# Patient Record
Sex: FEMALE | Race: WHITE | Employment: FULL TIME | URBAN - METROPOLITAN AREA
[De-identification: names, ages, dates, MRNs, and addresses within clinical notes are randomized per-mention and may not be internally consistent; named-entity substitution may affect disease eponyms.]

---

## 2017-11-29 PROBLEM — I89.0 LYMPHEDEMA: Status: ACTIVE | Noted: 2017-11-29

## 2017-11-29 PROBLEM — I87.8 VENOUS STASIS: Status: ACTIVE | Noted: 2017-11-29

## 2018-01-24 PROBLEM — N06.9 ISOLATED PROTEINURIA WITH MORPHOLOGIC LESION: Status: ACTIVE | Noted: 2018-01-24

## 2019-02-21 ENCOUNTER — TELEPHONE (OUTPATIENT)
Dept: INTERNAL MEDICINE | Age: 69
End: 2019-02-21

## 2019-06-22 ENCOUNTER — APPOINTMENT (OUTPATIENT)
Dept: GENERAL RADIOLOGY | Age: 69
End: 2019-06-22
Payer: COMMERCIAL

## 2019-06-22 ENCOUNTER — APPOINTMENT (OUTPATIENT)
Dept: CT IMAGING | Age: 69
End: 2019-06-22
Payer: COMMERCIAL

## 2019-06-22 ENCOUNTER — HOSPITAL ENCOUNTER (EMERGENCY)
Age: 69
Discharge: HOME | End: 2019-06-22
Attending: EMERGENCY MEDICINE
Payer: COMMERCIAL

## 2019-06-22 VITALS
HEART RATE: 82 BPM | BODY MASS INDEX: 28.32 KG/M2 | OXYGEN SATURATION: 95 % | RESPIRATION RATE: 16 BRPM | HEIGHT: 61 IN | TEMPERATURE: 97.9 F | WEIGHT: 150 LBS | SYSTOLIC BLOOD PRESSURE: 153 MMHG | DIASTOLIC BLOOD PRESSURE: 89 MMHG

## 2019-06-22 DIAGNOSIS — S80.02XA CONTUSION OF LEFT KNEE, INITIAL ENCOUNTER: ICD-10-CM

## 2019-06-22 DIAGNOSIS — M54.50 LUMBAR BACK PAIN: Primary | ICD-10-CM

## 2019-06-22 DIAGNOSIS — M47.816 OSTEOARTHRITIS OF LUMBAR SPINE, UNSPECIFIED SPINAL OSTEOARTHRITIS COMPLICATION STATUS: ICD-10-CM

## 2019-06-22 LAB
ANION GAP SERPL CALCULATED.3IONS-SCNC: 9 MMOL/L (ref 7–16)
BUN BLDV-MCNC: 12 MG/DL (ref 8–23)
CALCIUM SERPL-MCNC: 9.2 MG/DL (ref 8.6–10.2)
CHLORIDE BLD-SCNC: 104 MMOL/L (ref 98–107)
CO2: 29 MMOL/L (ref 22–29)
CREAT SERPL-MCNC: 0.9 MG/DL (ref 0.5–1)
GFR AFRICAN AMERICAN: >60
GFR NON-AFRICAN AMERICAN: >60 ML/MIN/1.73
GLUCOSE BLD-MCNC: 135 MG/DL (ref 74–99)
HCT VFR BLD CALC: 37.4 % (ref 34–48)
HEMOGLOBIN: 12 G/DL (ref 11.5–15.5)
MCH RBC QN AUTO: 31.5 PG (ref 26–35)
MCHC RBC AUTO-ENTMCNC: 32.1 % (ref 32–34.5)
MCV RBC AUTO: 98.2 FL (ref 80–99.9)
PDW BLD-RTO: 13.8 FL (ref 11.5–15)
PLATELET # BLD: 168 E9/L (ref 130–450)
PMV BLD AUTO: 10 FL (ref 7–12)
POTASSIUM SERPL-SCNC: 4.6 MMOL/L (ref 3.5–5)
RBC # BLD: 3.81 E12/L (ref 3.5–5.5)
SODIUM BLD-SCNC: 142 MMOL/L (ref 132–146)
WBC # BLD: 4.5 E9/L (ref 4.5–11.5)

## 2019-06-22 PROCEDURE — 73564 X-RAY EXAM KNEE 4 OR MORE: CPT

## 2019-06-22 PROCEDURE — 72125 CT NECK SPINE W/O DYE: CPT

## 2019-06-22 PROCEDURE — 96375 TX/PRO/DX INJ NEW DRUG ADDON: CPT

## 2019-06-22 PROCEDURE — 85027 COMPLETE CBC AUTOMATED: CPT

## 2019-06-22 PROCEDURE — 99284 EMERGENCY DEPT VISIT MOD MDM: CPT

## 2019-06-22 PROCEDURE — 73502 X-RAY EXAM HIP UNI 2-3 VIEWS: CPT

## 2019-06-22 PROCEDURE — 70450 CT HEAD/BRAIN W/O DYE: CPT

## 2019-06-22 PROCEDURE — 36415 COLL VENOUS BLD VENIPUNCTURE: CPT

## 2019-06-22 PROCEDURE — 96374 THER/PROPH/DIAG INJ IV PUSH: CPT

## 2019-06-22 PROCEDURE — 80048 BASIC METABOLIC PNL TOTAL CA: CPT

## 2019-06-22 PROCEDURE — 72131 CT LUMBAR SPINE W/O DYE: CPT

## 2019-06-22 PROCEDURE — 6360000002 HC RX W HCPCS: Performed by: EMERGENCY MEDICINE

## 2019-06-22 RX ORDER — ONDANSETRON 2 MG/ML
4 INJECTION INTRAMUSCULAR; INTRAVENOUS ONCE
Status: COMPLETED | OUTPATIENT
Start: 2019-06-22 | End: 2019-06-22

## 2019-06-22 RX ORDER — NAPROXEN 500 MG/1
500 TABLET ORAL 2 TIMES DAILY
Qty: 14 TABLET | Refills: 0 | Status: SHIPPED | OUTPATIENT
Start: 2019-06-22 | End: 2020-01-10 | Stop reason: ALTCHOICE

## 2019-06-22 RX ORDER — MORPHINE SULFATE 2 MG/ML
2 INJECTION, SOLUTION INTRAMUSCULAR; INTRAVENOUS ONCE
Status: COMPLETED | OUTPATIENT
Start: 2019-06-22 | End: 2019-06-22

## 2019-06-22 RX ADMIN — MORPHINE SULFATE 2 MG: 2 INJECTION, SOLUTION INTRAMUSCULAR; INTRAVENOUS at 21:55

## 2019-06-22 RX ADMIN — ONDANSETRON 4 MG: 2 INJECTION INTRAMUSCULAR; INTRAVENOUS at 21:55

## 2019-06-22 ASSESSMENT — PAIN DESCRIPTION - INTENSITY: RATING_2: 4

## 2019-06-22 ASSESSMENT — PAIN SCALES - GENERAL
PAINLEVEL_OUTOF10: 1
PAINLEVEL_OUTOF10: 6

## 2019-06-22 ASSESSMENT — PAIN DESCRIPTION - DURATION: DURATION_2: CONTINUOUS

## 2019-06-22 ASSESSMENT — PAIN DESCRIPTION - PROGRESSION
CLINICAL_PROGRESSION: GRADUALLY WORSENING
CLINICAL_PROGRESSION_2: GRADUALLY WORSENING

## 2019-06-22 ASSESSMENT — PAIN DESCRIPTION - ORIENTATION
ORIENTATION: LEFT
ORIENTATION_2: RIGHT

## 2019-06-22 ASSESSMENT — PAIN DESCRIPTION - PAIN TYPE
TYPE_2: ACUTE PAIN
TYPE: ACUTE PAIN

## 2019-06-22 ASSESSMENT — PAIN DESCRIPTION - DESCRIPTORS
DESCRIPTORS_2: ACHING
DESCRIPTORS: ACHING

## 2019-06-22 ASSESSMENT — PAIN DESCRIPTION - LOCATION
LOCATION: KNEE;HIP
LOCATION_2: NECK

## 2019-06-22 ASSESSMENT — PAIN DESCRIPTION - ONSET
ONSET_2: SUDDEN
ONSET: SUDDEN

## 2019-06-22 ASSESSMENT — PAIN DESCRIPTION - FREQUENCY: FREQUENCY: CONTINUOUS

## 2019-06-23 NOTE — ED NOTES
Bed: HE  Expected date:   Expected time:   Means of arrival:   Comments:  Hold EMS     Dorn Habermann, SHRUTHI  06/22/19 2005

## 2019-06-23 NOTE — ED NOTES
Pt ambulate from room four to hallway D w/stand by assist and wheeled walker. Pt tolerated fair. C/O pain in knees. Pt back in bed in [place] at this time. Will continue to monitor.      Vikash Almodovar RN  06/22/19 [id number]

## 2019-06-23 NOTE — ED PROVIDER NOTES
moist.   Neck: C collar in place. Respiratory: Lungs clear to auscultation bilaterally, no wheezes, rales, or rhonchi. Not in respiratory distress   Cardiovascular:  Regular rate. Regular rhythm. No murmurs, gallops, or rubs. 2+ distal pulses  GI:  Abdomen Soft, Non tender, Non distended. No rebound, guarding, or rigidity. Musculoskeletal: Moves all extremities x 4. Warm and well perfused. Tenderness to the lumbar spine, bilateral knee pain, left hip tenderness, limited ROM of the bilateral legs due to pain. Bilateral lower extremity weakness,  greater on the left compared to the right. Able to plantarflex and dorsiflex  Integument: skin warm and dry. Abrasion to the left forearm. Neurologic: GCS 15, moves all extremities noted weakness with raising left upper extremity able to plantarflex and dorsiflex, normal strength upper extremities  -------------------------------------------------- RESULTS -------------------------------------------------  I have personally reviewed all laboratory and imaging results for this patient. Results are listed below.      LABS:  Results for orders placed or performed during the hospital encounter of 06/22/19   CBC   Result Value Ref Range    WBC 4.5 4.5 - 11.5 E9/L    RBC 3.81 3.50 - 5.50 E12/L    Hemoglobin 12.0 11.5 - 15.5 g/dL    Hematocrit 37.4 34.0 - 48.0 %    MCV 98.2 80.0 - 99.9 fL    MCH 31.5 26.0 - 35.0 pg    MCHC 32.1 32.0 - 34.5 %    RDW 13.8 11.5 - 15.0 fL    Platelets 141 670 - 785 E9/L    MPV 10.0 7.0 - 12.0 fL   Basic Metabolic Panel   Result Value Ref Range    Sodium 142 132 - 146 mmol/L    Potassium 4.6 3.5 - 5.0 mmol/L    Chloride 104 98 - 107 mmol/L    CO2 29 22 - 29 mmol/L    Anion Gap 9 7 - 16 mmol/L    Glucose 135 (H) 74 - 99 mg/dL    BUN 12 8 - 23 mg/dL    CREATININE 0.9 0.5 - 1.0 mg/dL    GFR Non-African American >60 >=60 mL/min/1.73    GFR African American >60     Calcium 9.2 8.6 - 10.2 mg/dL       RADIOLOGY:  Interpreted by Radiologist.  Aleja Agudelo Cervical Spine WO Contrast   Final Result   No evidence of fracture or dislocation of cervical spine. There is   diffuse degenerative changes from C2 to T1 with osteophytes and   multilevel disc bulges. CT Head WO Contrast   Final Result   No evidence of intracranial hemorrhage or edema. There is   age-appropriate atrophy. CT Lumbar Spine WO Contrast   Final Result   No acute fracture. Diffuse degenerative changes with multilevel disc bulges. XR KNEE LEFT (MIN 4 VIEWS)   Final Result      NO ACUTE FRACTURE OR DISLOCATION OF THE LEFT KNEE. XR KNEE RIGHT (MIN 4 VIEWS)   Final Result      NO ACUTE FRACTURE OR DISLOCATION OF THE RIGHT KNEE.         XR HIP LEFT (2-3 VIEWS)   Final Result      NO ACUTE FRACTURE OR DISLOCATION LEFT HIP.        ------------------------- NURSING NOTES AND VITALS REVIEWED ---------------------------   The nursing notes within the ED encounter and vital signs as below have been reviewed by myself. BP (!) 153/89   Pulse 82   Temp 97.9 °F (36.6 °C) (Temporal)   Resp 16   Ht 5' 1\" (1.549 m)   Wt 150 lb (68 kg)   BMI 28.34 kg/m²   Oxygen Saturation Interpretation: Normal    The patients available past medical records and past encounters were reviewed. ------------------------------ ED COURSE/MEDICAL DECISION MAKING----------------------  Medications   morphine (PF) injection 2 mg (2 mg Intravenous Given 6/22/19 2155)   ondansetron (ZOFRAN) injection 4 mg (4 mg Intravenous Given 6/22/19 2155)       Medical Decision Making:    Patient arrived in the ED by EMS for a fall while she was working at [employer]. Patient tripped over a mat and is not sure if she hit her head, but did not have LOC.   Patient has bilateral knee pain, left hip pain, weakness to the bilateral legs that is greater in the left, limited ROM to the bilateral lower extremities due to pain, abrasion to the left forearm, tenderness to the lumbar spine, and

## 2019-06-25 ENCOUNTER — OFFICE VISIT (OUTPATIENT)
Dept: INTERNAL MEDICINE | Age: 69
End: 2019-06-25
Payer: COMMERCIAL

## 2019-06-25 VITALS
WEIGHT: 160.5 LBS | BODY MASS INDEX: 30.3 KG/M2 | HEIGHT: 61 IN | HEART RATE: 71 BPM | RESPIRATION RATE: 18 BRPM | SYSTOLIC BLOOD PRESSURE: 122 MMHG | DIASTOLIC BLOOD PRESSURE: 78 MMHG | TEMPERATURE: 97.4 F

## 2019-06-25 DIAGNOSIS — Z91.81 AT HIGH RISK FOR FALLS: Primary | ICD-10-CM

## 2019-06-25 PROCEDURE — 1090F PRES/ABSN URINE INCON ASSESS: CPT | Performed by: INTERNAL MEDICINE

## 2019-06-25 PROCEDURE — G8427 DOCREV CUR MEDS BY ELIG CLIN: HCPCS | Performed by: INTERNAL MEDICINE

## 2019-06-25 PROCEDURE — 1123F ACP DISCUSS/DSCN MKR DOCD: CPT | Performed by: INTERNAL MEDICINE

## 2019-06-25 PROCEDURE — 99212 OFFICE O/P EST SF 10 MIN: CPT | Performed by: INTERNAL MEDICINE

## 2019-06-25 PROCEDURE — 1036F TOBACCO NON-USER: CPT | Performed by: INTERNAL MEDICINE

## 2019-06-25 PROCEDURE — G8400 PT W/DXA NO RESULTS DOC: HCPCS | Performed by: INTERNAL MEDICINE

## 2019-06-25 PROCEDURE — 4040F PNEUMOC VAC/ADMIN/RCVD: CPT | Performed by: INTERNAL MEDICINE

## 2019-06-25 PROCEDURE — G8417 CALC BMI ABV UP PARAM F/U: HCPCS | Performed by: INTERNAL MEDICINE

## 2019-06-25 PROCEDURE — 99213 OFFICE O/P EST LOW 20 MIN: CPT | Performed by: INTERNAL MEDICINE

## 2019-06-25 PROCEDURE — 3017F COLORECTAL CA SCREEN DOC REV: CPT | Performed by: INTERNAL MEDICINE

## 2019-06-25 ASSESSMENT — PATIENT HEALTH QUESTIONNAIRE - PHQ9
SUM OF ALL RESPONSES TO PHQ9 QUESTIONS 1 & 2: 0
1. LITTLE INTEREST OR PLEASURE IN DOING THINGS: 0
SUM OF ALL RESPONSES TO PHQ QUESTIONS 1-9: 0
SUM OF ALL RESPONSES TO PHQ QUESTIONS 1-9: 0
2. FEELING DOWN, DEPRESSED OR HOPELESS: 0

## 2019-06-25 ASSESSMENT — ENCOUNTER SYMPTOMS
SHORTNESS OF BREATH: 0
STRIDOR: 0
SINUS PAIN: 0
RHINORRHEA: 0
ABDOMINAL PAIN: 0
COUGH: 0
NAUSEA: 0
DIARRHEA: 0
WHEEZING: 0
CONSTIPATION: 0
BLOOD IN STOOL: 0
VOMITING: 0
BACK PAIN: 1

## 2019-06-25 NOTE — LETTER
[address] [place] [address] Internal Medicine  [address] [facility] Na[address] AMARAL  Phone: [phone]  Fax: [phone]    Karan Craven MD        June 25, 2019     Patient: Irish Argueta   YOB: 1950   Date of Visit: 6/25/2019       To Whom it May Concern:    Osmel Eden was seen in the hospital 6/23/19 may return to work on 6/25/19. If you have any questions or concerns, please don't hesitate to call.     Sincerely,         Karan Craven MD

## 2019-06-25 NOTE — PROGRESS NOTES
Patient verbalized understanding of office instructions. She will call with questions or concerns. She was given discharge instructions, and all questions were fully answered.  Letter for work was given to pt Instructed to stop  At  for printed AVS

## 2019-06-25 NOTE — PROGRESS NOTES
[facility]  Internal Medicine Clinic    Attending Physician Statement:  Salina Miles M.D., F.A.C.P. I have discussed the case, including pertinent history and exam findings with the resident. I have seen and examined the patient and the key elements of the encounter have been performed by me. I agree with the assessment, plan and orders as documented by the resident. Patient is seen for fu visit today. Last office notes reviewed, relative labs and imaging. Sporadic fu -- 2017 - ER fu various issues  One visit 2018-echo fu not significant, some lymphedema    Now ER fu   Sp mechanical fall-- multiple pain complaints, seen in ER  (We can't do workers comp-- would need to establish with doc of record if pursues  -- ok for verbal return to work  [employer] of medical problems as per resident note.

## 2019-06-25 NOTE — LETTER
[address] [place] [address] Internal Medicine  [address] [place] [address] Na[place] AMARAL  Phone: [phone]  Fax: [phone]    Gennaro Redman MD        June 25, 2019     Patient: Joseph Kingston   YOB: 1950   Date of Visit: 6/25/2019       To Whom it May Concern:    Ghassan Muhammad was seen in the hospital 6/22/19 may return to work on 6/25/19. If you have any questions or concerns, please don't hesitate to call.     Sincerely,         Gennaro Redman MD

## 2019-06-25 NOTE — PATIENT INSTRUCTIONS
Discharge instructions:  Establish with a Primary care doctor that deals with worker's compensation      Patient Education        Preventing Falls: Care Instructions  Your Care Instructions    Getting around your home safely can be a challenge if you have injuries or health problems that make it easy for you to fall. Loose rugs and furniture in walkways are among the dangers for many older people who have problems walking or who have poor eyesight. People who have conditions such as arthritis, osteoporosis, or dementia also have to be careful not to fall. You can make your home safer with a few simple measures. Follow-up care is a key part of your treatment and safety. Be sure to make and go to all appointments, and call your doctor if you are having problems. It's also a good idea to know your test results and keep a list of the medicines you take. How can you care for yourself at home? Taking care of yourself  · You may get dizzy if you do not drink enough water. To prevent dehydration, drink plenty of fluids, enough so that your urine is light yellow or clear like water. Choose water and other caffeine-free clear liquids. If you have kidney, heart, or liver disease and have to limit fluids, talk with your doctor before you increase the amount of fluids you drink. · Exercise regularly to improve your strength, muscle tone, and balance. Walk if you can. Swimming may be a good choice if you cannot walk easily. · Have your vision and hearing checked each year or any time you notice a change. If you have trouble seeing and hearing, you might not be able to avoid objects and could lose your balance. · Know the side effects of the medicines you take. Ask your doctor or pharmacist whether the medicines you take can affect your balance. Sleeping pills or sedatives can affect your balance. · Limit the amount of alcohol you drink. Alcohol can impair your balance and other senses.   · Ask your doctor whether calluses or corns on your feet need to be removed. If you wear loose-fitting shoes because of calluses or corns, you can lose your balance and fall. · Talk to your doctor if you have numbness in your feet. Preventing falls at home  · Remove raised doorway thresholds, throw rugs, and clutter. Repair loose carpet or raised areas in the floor. · Move furniture and electrical cords to keep them out of walking paths. · Use nonskid floor wax, and wipe up spills right away, especially on ceramic tile floors. · If you use a walker or cane, put rubber tips on it. If you use crutches, clean the bottoms of them regularly with an abrasive pad, such as steel wool. · Keep your house well lit, especially Lalitha Serene, and outside walkways. Use night-lights in areas such as hallways and bathrooms. Add extra light switches or use remote switches (such as switches that go on or off when you clap your hands) to make it easier to turn lights on if you have to get up during the night. · Install sturdy handrails on stairways. · Move items in your cabinets so that the things you use a lot are on the lower shelves (about waist level). · Keep a cordless phone and a flashlight with new batteries by your bed. If possible, put a phone in each of the main rooms of your house, or carry a cell phone in case you fall and cannot reach a phone. Or, you can wear a device around your neck or wrist. You push a button that sends a signal for help. · Wear low-heeled shoes that fit well and give your feet good support. Use footwear with nonskid soles. Check the heels and soles of your shoes for wear. Repair or replace worn heels or soles. · Do not wear socks without shoes on wood floors. · Walk on the grass when the sidewalks are slippery. If you live in an area that gets snow and ice in the winter, sprinkle salt on slippery steps and sidewalks.   Preventing falls in the bath  · Install grab bars and nonskid mats inside and outside your shower or tub and near the toilet and sinks. · Use shower chairs and bath benches. · Use a hand-held shower head that will allow you to sit while showering. · Get into a tub or shower by putting the weaker leg in first. Get out of a tub or shower with your strong side first.  · Repair loose toilet seats and consider installing a raised toilet seat to make getting on and off the toilet easier. · Keep your bathroom door unlocked while you are in the shower. Where can you learn more? Go to https://Poyntpepiceweb.Qian Xiaoâ€™er. org and sign in to your [employer] account. Enter [phone] in the [employer].com box to learn more about \"Preventing Falls: Care Instructions. \"     If you do not have an account, please click on the \"Sign Up Now\" link. Current as of: November 7, 2018  Content Version: 12.0  © 2861-9583 Healthwise, Incorporated. Care instructions adapted under license by [facility] [address] If you have questions about a medical condition or this instruction, always ask your healthcare professional. [place] [address] any warranty or liability for your use of this information.

## 2019-06-25 NOTE — PROGRESS NOTES
[facility]  InternalMedicine Residency Program  ACC Note      SUBJECTIVE:  CC: had concerns including Follow-up (from ER fell at work on both knees, also had low back pain). HPI:Ora Ambrose is a 70 y.o. presents to the [facility] as an ER follow-up . She presented to the ED 3 days ago following a mechanical fall that happened a few hours prior to arrival. She was at worked, tripped over a mat and fell, unsure of head trauma, but denies LOC. She did complain of pain in multiple locations: b/l knees, left hip and weakness in b/l lower extremities. Imaging done was negative for any acute fracture of dislocation, however evidence of diffuse degenerative changes were observed with bulging discs at multiple levels C2-T1 and Lumbar spine as well. She was given NSAIDs  She mentions worker's compensation which I relayed we were unable to process from this clinic. She was advised to establish and follow up with a primary care doctor. Review of Systems   Constitutional: Negative for chills, fatigue and fever. HENT: Negative for congestion, rhinorrhea and sinus pain. Respiratory: Negative for cough, shortness of breath, wheezing and stridor. Cardiovascular: Negative for chest pain, palpitations and leg swelling. Gastrointestinal: Negative for abdominal pain, blood in stool, constipation, diarrhea, nausea and vomiting. Genitourinary: Negative for dysuria, frequency and hematuria. Musculoskeletal: Positive for arthralgias, back pain and gait problem. Negative for myalgias. Skin: Negative for rash. Allergic/Immunologic: Negative for environmental allergies. Neurological: Negative for dizziness, weakness, numbness and headaches. Hematological: Does not bruise/bleed easily. Psychiatric/Behavioral: Negative for suicidal ideas.        No outpatient medications have been marked as taking for the 6/25/19 encounter (Office Visit) with Sam Keane MD.       I have reviewed all pertinent PMHx, PSHx, FamHx, SocialHx, medications, and allergiesand updated history as appropriate. OBJECTIVE:    VS: There were no vitals taken for this visit. Physical Exam   Constitutional: She is oriented to person, place, and time. She appears well-developed and well-nourished. No distress. HENT:   Head: Normocephalic and atraumatic. Eyes: Pupils are equal, round, and reactive to light. Conjunctivae are normal. No scleral icterus. Cardiovascular: Normal rate, regular rhythm, S1 normal, S2 normal, normal heart sounds and intact distal pulses. No murmur heard. Pulmonary/Chest: Effort normal and breath sounds normal. No respiratory distress. She has no wheezes. She has no rales. Abdominal: Soft. Bowel sounds are normal. She exhibits no distension and no mass. There is no tenderness. There is no guarding. Musculoskeletal: She exhibits tenderness. She exhibits no edema. Left knee tenderness and mild abrasion   Neurological: She is alert and oriented to person, place, and time. No cranial nerve deficit. Skin: Skin is warm and dry. She is not diaphoretic.        ASSESSMENT/PLAN:    Mechanical fall   - no fractures or dislocations  - patient stable and able to return to work    Patient advised to establish with a PCP that deals with worker's compensation      I have reviewed my findings and recommendations with Tony Martinez and Dr Cristal Mason MD PGY-1   6/25/2019 9:53 AM

## 2019-10-04 ENCOUNTER — TELEPHONE (OUTPATIENT)
Dept: ADMINISTRATIVE | Age: 69
End: 2019-10-04

## 2019-11-24 ENCOUNTER — APPOINTMENT (OUTPATIENT)
Dept: GENERAL RADIOLOGY | Age: 69
DRG: 069 | End: 2019-11-24
Payer: COMMERCIAL

## 2019-11-24 ENCOUNTER — APPOINTMENT (OUTPATIENT)
Dept: CT IMAGING | Age: 69
DRG: 069 | End: 2019-11-24
Payer: COMMERCIAL

## 2019-11-24 ENCOUNTER — APPOINTMENT (OUTPATIENT)
Dept: MRI IMAGING | Age: 69
DRG: 069 | End: 2019-11-24
Payer: COMMERCIAL

## 2019-11-24 ENCOUNTER — HOSPITAL ENCOUNTER (INPATIENT)
Age: 69
LOS: 3 days | Discharge: HOME HEALTH | DRG: 069 | End: 2019-11-27
Attending: EMERGENCY MEDICINE | Admitting: INTERNAL MEDICINE
Payer: COMMERCIAL

## 2019-11-24 DIAGNOSIS — R47.9 DIFFICULTY WITH SPEECH: ICD-10-CM

## 2019-11-24 DIAGNOSIS — M48.02 CERVICAL SPINAL STENOSIS: ICD-10-CM

## 2019-11-24 DIAGNOSIS — R41.82 ALTERED MENTAL STATUS, UNSPECIFIED ALTERED MENTAL STATUS TYPE: Primary | ICD-10-CM

## 2019-11-24 DIAGNOSIS — M48.061 DEGENERATIVE LUMBAR SPINAL STENOSIS: ICD-10-CM

## 2019-11-24 LAB
ACETAMINOPHEN LEVEL: <5 MCG/ML (ref 10–30)
ALBUMIN SERPL-MCNC: 4.1 G/DL (ref 3.5–5.2)
ALP BLD-CCNC: 90 U/L (ref 35–104)
ALT SERPL-CCNC: 9 U/L (ref 0–32)
AMMONIA: 15 UMOL/L (ref 11–51)
AMPHETAMINE SCREEN, URINE: NOT DETECTED
ANION GAP SERPL CALCULATED.3IONS-SCNC: 12 MMOL/L (ref 7–16)
AST SERPL-CCNC: 12 U/L (ref 0–31)
BACTERIA: ABNORMAL /HPF
BARBITURATE SCREEN URINE: NOT DETECTED
BASOPHILS ABSOLUTE: 0.01 E9/L (ref 0–0.2)
BASOPHILS RELATIVE PERCENT: 0.3 % (ref 0–2)
BENZODIAZEPINE SCREEN, URINE: NOT DETECTED
BILIRUB SERPL-MCNC: 0.3 MG/DL (ref 0–1.2)
BILIRUBIN URINE: NEGATIVE
BLOOD, URINE: ABNORMAL
BUN BLDV-MCNC: 12 MG/DL (ref 8–23)
CALCIUM SERPL-MCNC: 9.1 MG/DL (ref 8.6–10.2)
CANNABINOID SCREEN URINE: NOT DETECTED
CHLORIDE BLD-SCNC: 106 MMOL/L (ref 98–107)
CHOLESTEROL, TOTAL: 197 MG/DL (ref 0–199)
CLARITY: CLEAR
CO2: 25 MMOL/L (ref 22–29)
COCAINE METABOLITE SCREEN URINE: NOT DETECTED
COLOR: YELLOW
CREAT SERPL-MCNC: 0.9 MG/DL (ref 0.5–1)
EKG ATRIAL RATE: 78 BPM
EKG P AXIS: 52 DEGREES
EKG P-R INTERVAL: 180 MS
EKG Q-T INTERVAL: 402 MS
EKG QRS DURATION: 88 MS
EKG QTC CALCULATION (BAZETT): 458 MS
EKG R AXIS: 40 DEGREES
EKG T AXIS: 49 DEGREES
EKG VENTRICULAR RATE: 78 BPM
EOSINOPHILS ABSOLUTE: 0.04 E9/L (ref 0.05–0.5)
EOSINOPHILS RELATIVE PERCENT: 1.1 % (ref 0–6)
EPITHELIAL CELLS, UA: ABNORMAL /HPF
ETHANOL: <10 MG/DL (ref 0–0.08)
FENTANYL SCREEN, URINE: NOT DETECTED
FOLATE: 19.1 NG/ML (ref 4.8–24.2)
GFR AFRICAN AMERICAN: >60
GFR NON-AFRICAN AMERICAN: >60 ML/MIN/1.73
GLUCOSE BLD-MCNC: 105 MG/DL (ref 74–99)
GLUCOSE URINE: NEGATIVE MG/DL
HBA1C MFR BLD: 6.4 % (ref 4–5.6)
HCT VFR BLD CALC: 39.8 % (ref 34–48)
HDLC SERPL-MCNC: 52 MG/DL
HEMOGLOBIN: 12.6 G/DL (ref 11.5–15.5)
IMMATURE GRANULOCYTES #: 0.02 E9/L
IMMATURE GRANULOCYTES %: 0.6 % (ref 0–5)
KETONES, URINE: NEGATIVE MG/DL
LACTIC ACID: 1.3 MMOL/L (ref 0.5–2.2)
LDL CHOLESTEROL CALCULATED: 132 MG/DL (ref 0–99)
LEUKOCYTE ESTERASE, URINE: NEGATIVE
LIPASE: 45 U/L (ref 13–60)
LYMPHOCYTES ABSOLUTE: 1.71 E9/L (ref 1.5–4)
LYMPHOCYTES RELATIVE PERCENT: 47.9 % (ref 20–42)
Lab: NORMAL
MCH RBC QN AUTO: 30.7 PG (ref 26–35)
MCHC RBC AUTO-ENTMCNC: 31.7 % (ref 32–34.5)
MCV RBC AUTO: 96.8 FL (ref 80–99.9)
METER GLUCOSE: 113 MG/DL (ref 74–99)
METER GLUCOSE: 79 MG/DL (ref 74–99)
METHADONE SCREEN, URINE: NOT DETECTED
MONOCYTES ABSOLUTE: 0.3 E9/L (ref 0.1–0.95)
MONOCYTES RELATIVE PERCENT: 8.4 % (ref 2–12)
NEUTROPHILS ABSOLUTE: 1.49 E9/L (ref 1.8–7.3)
NEUTROPHILS RELATIVE PERCENT: 41.7 % (ref 43–80)
NITRITE, URINE: NEGATIVE
OPIATE SCREEN URINE: NOT DETECTED
OXYCODONE URINE: NOT DETECTED
PDW BLD-RTO: 13.9 FL (ref 11.5–15)
PH UA: 7 (ref 5–9)
PHENCYCLIDINE SCREEN URINE: NOT DETECTED
PLATELET # BLD: 162 E9/L (ref 130–450)
PMV BLD AUTO: 10.5 FL (ref 7–12)
POTASSIUM REFLEX MAGNESIUM: 4 MMOL/L (ref 3.5–5)
PRO-BNP: 41 PG/ML (ref 0–125)
PROTEIN UA: NEGATIVE MG/DL
RBC # BLD: 4.11 E12/L (ref 3.5–5.5)
RBC UA: ABNORMAL /HPF (ref 0–2)
SALICYLATE, SERUM: <0.3 MG/DL (ref 0–30)
SODIUM BLD-SCNC: 143 MMOL/L (ref 132–146)
SPECIFIC GRAVITY UA: 1.01 (ref 1–1.03)
T4 FREE: 1.11 NG/DL (ref 0.93–1.7)
TOTAL PROTEIN: 6.8 G/DL (ref 6.4–8.3)
TRICYCLIC ANTIDEPRESSANTS SCREEN SERUM: NEGATIVE NG/ML
TRIGL SERPL-MCNC: 66 MG/DL (ref 0–149)
TROPONIN: <0.01 NG/ML (ref 0–0.03)
TSH SERPL DL<=0.05 MIU/L-ACNC: 3.36 UIU/ML (ref 0.27–4.2)
UROBILINOGEN, URINE: 0.2 E.U./DL
VITAMIN B-12: 347 PG/ML (ref 211–946)
VLDLC SERPL CALC-MCNC: 13 MG/DL
WBC # BLD: 3.6 E9/L (ref 4.5–11.5)
WBC UA: ABNORMAL /HPF (ref 0–5)

## 2019-11-24 PROCEDURE — 93005 ELECTROCARDIOGRAM TRACING: CPT | Performed by: EMERGENCY MEDICINE

## 2019-11-24 PROCEDURE — 80053 COMPREHEN METABOLIC PANEL: CPT

## 2019-11-24 PROCEDURE — 6370000000 HC RX 637 (ALT 250 FOR IP): Performed by: STUDENT IN AN ORGANIZED HEALTH CARE EDUCATION/TRAINING PROGRAM

## 2019-11-24 PROCEDURE — 93010 ELECTROCARDIOGRAM REPORT: CPT | Performed by: INTERNAL MEDICINE

## 2019-11-24 PROCEDURE — 83605 ASSAY OF LACTIC ACID: CPT

## 2019-11-24 PROCEDURE — 6370000000 HC RX 637 (ALT 250 FOR IP): Performed by: INTERNAL MEDICINE

## 2019-11-24 PROCEDURE — 83690 ASSAY OF LIPASE: CPT

## 2019-11-24 PROCEDURE — 82607 VITAMIN B-12: CPT

## 2019-11-24 PROCEDURE — 80307 DRUG TEST PRSMV CHEM ANLYZR: CPT

## 2019-11-24 PROCEDURE — 2140000000 HC CCU INTERMEDIATE R&B

## 2019-11-24 PROCEDURE — 84439 ASSAY OF FREE THYROXINE: CPT

## 2019-11-24 PROCEDURE — 85025 COMPLETE CBC W/AUTO DIFF WBC: CPT

## 2019-11-24 PROCEDURE — 70498 CT ANGIOGRAPHY NECK: CPT

## 2019-11-24 PROCEDURE — 2580000003 HC RX 258: Performed by: RADIOLOGY

## 2019-11-24 PROCEDURE — G0480 DRUG TEST DEF 1-7 CLASSES: HCPCS

## 2019-11-24 PROCEDURE — 82962 GLUCOSE BLOOD TEST: CPT

## 2019-11-24 PROCEDURE — 99221 1ST HOSP IP/OBS SF/LOW 40: CPT | Performed by: PSYCHIATRY & NEUROLOGY

## 2019-11-24 PROCEDURE — 82140 ASSAY OF AMMONIA: CPT

## 2019-11-24 PROCEDURE — 71045 X-RAY EXAM CHEST 1 VIEW: CPT

## 2019-11-24 PROCEDURE — 70551 MRI BRAIN STEM W/O DYE: CPT

## 2019-11-24 PROCEDURE — 82746 ASSAY OF FOLIC ACID SERUM: CPT

## 2019-11-24 PROCEDURE — 87088 URINE BACTERIA CULTURE: CPT

## 2019-11-24 PROCEDURE — 99285 EMERGENCY DEPT VISIT HI MDM: CPT

## 2019-11-24 PROCEDURE — 87040 BLOOD CULTURE FOR BACTERIA: CPT

## 2019-11-24 PROCEDURE — 70450 CT HEAD/BRAIN W/O DYE: CPT

## 2019-11-24 PROCEDURE — 6360000004 HC RX CONTRAST MEDICATION: Performed by: RADIOLOGY

## 2019-11-24 PROCEDURE — 36415 COLL VENOUS BLD VENIPUNCTURE: CPT

## 2019-11-24 PROCEDURE — 2580000003 HC RX 258: Performed by: EMERGENCY MEDICINE

## 2019-11-24 PROCEDURE — 70496 CT ANGIOGRAPHY HEAD: CPT

## 2019-11-24 PROCEDURE — 84484 ASSAY OF TROPONIN QUANT: CPT

## 2019-11-24 PROCEDURE — 99222 1ST HOSP IP/OBS MODERATE 55: CPT | Performed by: INTERNAL MEDICINE

## 2019-11-24 PROCEDURE — 2580000003 HC RX 258: Performed by: INTERNAL MEDICINE

## 2019-11-24 PROCEDURE — 84443 ASSAY THYROID STIM HORMONE: CPT

## 2019-11-24 PROCEDURE — 83036 HEMOGLOBIN GLYCOSYLATED A1C: CPT

## 2019-11-24 PROCEDURE — 6360000002 HC RX W HCPCS: Performed by: INTERNAL MEDICINE

## 2019-11-24 PROCEDURE — 80061 LIPID PANEL: CPT

## 2019-11-24 PROCEDURE — 83880 ASSAY OF NATRIURETIC PEPTIDE: CPT

## 2019-11-24 PROCEDURE — 81001 URINALYSIS AUTO W/SCOPE: CPT

## 2019-11-24 PROCEDURE — 2500000003 HC RX 250 WO HCPCS: Performed by: INTERNAL MEDICINE

## 2019-11-24 RX ORDER — 0.9 % SODIUM CHLORIDE 0.9 %
1000 INTRAVENOUS SOLUTION INTRAVENOUS ONCE
Status: COMPLETED | OUTPATIENT
Start: 2019-11-24 | End: 2019-11-24

## 2019-11-24 RX ORDER — ONDANSETRON 2 MG/ML
4 INJECTION INTRAMUSCULAR; INTRAVENOUS EVERY 6 HOURS PRN
Status: DISCONTINUED | OUTPATIENT
Start: 2019-11-24 | End: 2019-11-27 | Stop reason: HOSPADM

## 2019-11-24 RX ORDER — SODIUM CHLORIDE 0.9 % (FLUSH) 0.9 %
10 SYRINGE (ML) INJECTION EVERY 12 HOURS SCHEDULED
Status: DISCONTINUED | OUTPATIENT
Start: 2019-11-24 | End: 2019-11-27 | Stop reason: HOSPADM

## 2019-11-24 RX ORDER — HYDRALAZINE HYDROCHLORIDE 20 MG/ML
10 INJECTION INTRAMUSCULAR; INTRAVENOUS EVERY 4 HOURS PRN
Status: DISCONTINUED | OUTPATIENT
Start: 2019-11-24 | End: 2019-11-24

## 2019-11-24 RX ORDER — SODIUM CHLORIDE 0.9 % (FLUSH) 0.9 %
10 SYRINGE (ML) INJECTION
Status: COMPLETED | OUTPATIENT
Start: 2019-11-24 | End: 2019-11-24

## 2019-11-24 RX ORDER — LABETALOL HYDROCHLORIDE 5 MG/ML
10 INJECTION, SOLUTION INTRAVENOUS EVERY 4 HOURS PRN
Status: DISCONTINUED | OUTPATIENT
Start: 2019-11-24 | End: 2019-11-25

## 2019-11-24 RX ORDER — NICOTINE POLACRILEX 4 MG
15 LOZENGE BUCCAL PRN
Status: DISCONTINUED | OUTPATIENT
Start: 2019-11-24 | End: 2019-11-27 | Stop reason: HOSPADM

## 2019-11-24 RX ORDER — DEXTROSE MONOHYDRATE 25 G/50ML
12.5 INJECTION, SOLUTION INTRAVENOUS PRN
Status: DISCONTINUED | OUTPATIENT
Start: 2019-11-24 | End: 2019-11-27 | Stop reason: HOSPADM

## 2019-11-24 RX ORDER — LISINOPRIL 10 MG/1
10 TABLET ORAL DAILY
Status: DISCONTINUED | OUTPATIENT
Start: 2019-11-24 | End: 2019-11-27 | Stop reason: HOSPADM

## 2019-11-24 RX ORDER — AMLODIPINE BESYLATE 5 MG/1
5 TABLET ORAL DAILY
Status: DISCONTINUED | OUTPATIENT
Start: 2019-11-25 | End: 2019-11-24

## 2019-11-24 RX ORDER — LABETALOL HYDROCHLORIDE 5 MG/ML
10 INJECTION, SOLUTION INTRAVENOUS EVERY 4 HOURS
Status: DISCONTINUED | OUTPATIENT
Start: 2019-11-24 | End: 2019-11-24

## 2019-11-24 RX ORDER — DEXTROSE MONOHYDRATE 50 MG/ML
100 INJECTION, SOLUTION INTRAVENOUS PRN
Status: DISCONTINUED | OUTPATIENT
Start: 2019-11-24 | End: 2019-11-27 | Stop reason: HOSPADM

## 2019-11-24 RX ORDER — SODIUM CHLORIDE 0.9 % (FLUSH) 0.9 %
10 SYRINGE (ML) INJECTION PRN
Status: DISCONTINUED | OUTPATIENT
Start: 2019-11-24 | End: 2019-11-27 | Stop reason: HOSPADM

## 2019-11-24 RX ORDER — ACETAMINOPHEN 500 MG
1000 TABLET ORAL EVERY 8 HOURS PRN
Status: DISCONTINUED | OUTPATIENT
Start: 2019-11-24 | End: 2019-11-26

## 2019-11-24 RX ORDER — NIMODIPINE 30 MG/1
60 CAPSULE, LIQUID FILLED ORAL
Status: DISCONTINUED | OUTPATIENT
Start: 2019-11-24 | End: 2019-11-26

## 2019-11-24 RX ORDER — ASPIRIN 81 MG/1
81 TABLET, CHEWABLE ORAL DAILY
Status: DISCONTINUED | OUTPATIENT
Start: 2019-11-24 | End: 2019-11-24

## 2019-11-24 RX ORDER — ATORVASTATIN CALCIUM 40 MG/1
40 TABLET, FILM COATED ORAL NIGHTLY
Status: DISCONTINUED | OUTPATIENT
Start: 2019-11-24 | End: 2019-11-27 | Stop reason: HOSPADM

## 2019-11-24 RX ADMIN — ENOXAPARIN SODIUM 40 MG: 40 INJECTION SUBCUTANEOUS at 10:14

## 2019-11-24 RX ADMIN — SODIUM CHLORIDE 1000 ML: 9 INJECTION, SOLUTION INTRAVENOUS at 04:42

## 2019-11-24 RX ADMIN — ASPIRIN 81 MG 81 MG: 81 TABLET ORAL at 10:14

## 2019-11-24 RX ADMIN — Medication 10 ML: at 10:14

## 2019-11-24 RX ADMIN — LABETALOL HYDROCHLORIDE 10 MG: 5 INJECTION INTRAVENOUS at 22:38

## 2019-11-24 RX ADMIN — Medication 10 ML: at 22:39

## 2019-11-24 RX ADMIN — ACETAMINOPHEN 1000 MG: 500 TABLET ORAL at 23:08

## 2019-11-24 RX ADMIN — ATORVASTATIN CALCIUM 40 MG: 40 TABLET, FILM COATED ORAL at 10:13

## 2019-11-24 RX ADMIN — IOPAMIDOL 60 ML: 755 INJECTION, SOLUTION INTRAVENOUS at 12:02

## 2019-11-24 RX ADMIN — Medication 10 ML: at 12:02

## 2019-11-24 RX ADMIN — LISINOPRIL 10 MG: 10 TABLET ORAL at 22:38

## 2019-11-24 ASSESSMENT — PAIN SCALES - GENERAL
PAINLEVEL_OUTOF10: 0
PAINLEVEL_OUTOF10: 6

## 2019-11-24 ASSESSMENT — ENCOUNTER SYMPTOMS
GASTROINTESTINAL NEGATIVE: 1
EYES NEGATIVE: 1
RESPIRATORY NEGATIVE: 1

## 2019-11-24 ASSESSMENT — PAIN DESCRIPTION - DESCRIPTORS: DESCRIPTORS: HEADACHE

## 2019-11-24 ASSESSMENT — PAIN DESCRIPTION - LOCATION: LOCATION: HEAD

## 2019-11-24 ASSESSMENT — PAIN DESCRIPTION - PAIN TYPE: TYPE: ACUTE PAIN

## 2019-11-25 ENCOUNTER — APPOINTMENT (OUTPATIENT)
Dept: CT IMAGING | Age: 69
DRG: 069 | End: 2019-11-25
Payer: COMMERCIAL

## 2019-11-25 ENCOUNTER — APPOINTMENT (OUTPATIENT)
Dept: NEUROLOGY | Age: 69
DRG: 069 | End: 2019-11-25
Payer: COMMERCIAL

## 2019-11-25 ENCOUNTER — APPOINTMENT (OUTPATIENT)
Dept: MRI IMAGING | Age: 69
DRG: 069 | End: 2019-11-25
Payer: COMMERCIAL

## 2019-11-25 LAB
ALBUMIN SERPL-MCNC: 3.4 G/DL (ref 3.5–5.2)
ALP BLD-CCNC: 66 U/L (ref 35–104)
ALT SERPL-CCNC: 6 U/L (ref 0–32)
ANION GAP SERPL CALCULATED.3IONS-SCNC: 13 MMOL/L (ref 7–16)
AST SERPL-CCNC: 10 U/L (ref 0–31)
BILIRUB SERPL-MCNC: 0.3 MG/DL (ref 0–1.2)
BUN BLDV-MCNC: 14 MG/DL (ref 8–23)
CALCIUM SERPL-MCNC: 8.3 MG/DL (ref 8.6–10.2)
CHLORIDE BLD-SCNC: 105 MMOL/L (ref 98–107)
CO2: 23 MMOL/L (ref 22–29)
CREAT SERPL-MCNC: 1.1 MG/DL (ref 0.5–1)
GFR AFRICAN AMERICAN: 59
GFR NON-AFRICAN AMERICAN: 49 ML/MIN/1.73
GLUCOSE BLD-MCNC: 115 MG/DL (ref 74–99)
METER GLUCOSE: 111 MG/DL (ref 74–99)
METER GLUCOSE: 113 MG/DL (ref 74–99)
METER GLUCOSE: 120 MG/DL (ref 74–99)
POTASSIUM REFLEX MAGNESIUM: 3.7 MMOL/L (ref 3.5–5)
SODIUM BLD-SCNC: 141 MMOL/L (ref 132–146)
TOTAL PROTEIN: 5.7 G/DL (ref 6.4–8.3)

## 2019-11-25 PROCEDURE — 80053 COMPREHEN METABOLIC PANEL: CPT

## 2019-11-25 PROCEDURE — 6370000000 HC RX 637 (ALT 250 FOR IP): Performed by: STUDENT IN AN ORGANIZED HEALTH CARE EDUCATION/TRAINING PROGRAM

## 2019-11-25 PROCEDURE — 72148 MRI LUMBAR SPINE W/O DYE: CPT

## 2019-11-25 PROCEDURE — 70450 CT HEAD/BRAIN W/O DYE: CPT

## 2019-11-25 PROCEDURE — 97165 OT EVAL LOW COMPLEX 30 MIN: CPT

## 2019-11-25 PROCEDURE — 95819 EEG AWAKE AND ASLEEP: CPT

## 2019-11-25 PROCEDURE — 2580000003 HC RX 258: Performed by: INTERNAL MEDICINE

## 2019-11-25 PROCEDURE — 2140000000 HC CCU INTERMEDIATE R&B

## 2019-11-25 PROCEDURE — 2580000003 HC RX 258: Performed by: STUDENT IN AN ORGANIZED HEALTH CARE EDUCATION/TRAINING PROGRAM

## 2019-11-25 PROCEDURE — 97535 SELF CARE MNGMENT TRAINING: CPT

## 2019-11-25 PROCEDURE — 97161 PT EVAL LOW COMPLEX 20 MIN: CPT

## 2019-11-25 PROCEDURE — 36415 COLL VENOUS BLD VENIPUNCTURE: CPT

## 2019-11-25 PROCEDURE — 95816 EEG AWAKE AND DROWSY: CPT | Performed by: PSYCHIATRY & NEUROLOGY

## 2019-11-25 PROCEDURE — 97530 THERAPEUTIC ACTIVITIES: CPT

## 2019-11-25 PROCEDURE — 82962 GLUCOSE BLOOD TEST: CPT

## 2019-11-25 RX ORDER — 0.9 % SODIUM CHLORIDE 0.9 %
500 INTRAVENOUS SOLUTION INTRAVENOUS ONCE
Status: COMPLETED | OUTPATIENT
Start: 2019-11-25 | End: 2019-11-25

## 2019-11-25 RX ORDER — LABETALOL HYDROCHLORIDE 5 MG/ML
5 INJECTION, SOLUTION INTRAVENOUS EVERY 4 HOURS PRN
Status: DISCONTINUED | OUTPATIENT
Start: 2019-11-25 | End: 2019-11-26

## 2019-11-25 RX ORDER — LEVETIRACETAM 500 MG/1
500 TABLET ORAL 2 TIMES DAILY
Status: DISCONTINUED | OUTPATIENT
Start: 2019-11-25 | End: 2019-11-27 | Stop reason: HOSPADM

## 2019-11-25 RX ADMIN — Medication 10 ML: at 21:00

## 2019-11-25 RX ADMIN — SODIUM CHLORIDE 500 ML: 9 INJECTION, SOLUTION INTRAVENOUS at 04:47

## 2019-11-25 RX ADMIN — Medication 10 ML: at 12:21

## 2019-11-25 RX ADMIN — SODIUM CHLORIDE 500 ML: 9 INJECTION, SOLUTION INTRAVENOUS at 12:21

## 2019-11-25 RX ADMIN — SODIUM CHLORIDE 500 ML: 9 INJECTION, SOLUTION INTRAVENOUS at 16:35

## 2019-11-25 RX ADMIN — ACETAMINOPHEN 1000 MG: 500 TABLET ORAL at 21:06

## 2019-11-25 ASSESSMENT — PAIN DESCRIPTION - PAIN TYPE: TYPE: ACUTE PAIN

## 2019-11-25 ASSESSMENT — PAIN SCALES - GENERAL
PAINLEVEL_OUTOF10: 0
PAINLEVEL_OUTOF10: 2
PAINLEVEL_OUTOF10: 6

## 2019-11-25 ASSESSMENT — PAIN DESCRIPTION - DESCRIPTORS: DESCRIPTORS: ACHING

## 2019-11-25 ASSESSMENT — PAIN DESCRIPTION - ORIENTATION: ORIENTATION: LEFT

## 2019-11-25 ASSESSMENT — PAIN DESCRIPTION - FREQUENCY: FREQUENCY: CONTINUOUS

## 2019-11-25 ASSESSMENT — PAIN DESCRIPTION - LOCATION: LOCATION: HEAD;KNEE

## 2019-11-26 ENCOUNTER — APPOINTMENT (OUTPATIENT)
Dept: ULTRASOUND IMAGING | Age: 69
DRG: 069 | End: 2019-11-26
Payer: COMMERCIAL

## 2019-11-26 LAB
FERRITIN: 117 NG/ML
IRON SATURATION: 33 % (ref 15–50)
IRON: 97 MCG/DL (ref 37–145)
METER GLUCOSE: 103 MG/DL (ref 74–99)
METER GLUCOSE: 99 MG/DL (ref 74–99)
TOTAL IRON BINDING CAPACITY: 294 MCG/DL (ref 250–450)
URINE CULTURE, ROUTINE: NORMAL

## 2019-11-26 PROCEDURE — 6370000000 HC RX 637 (ALT 250 FOR IP): Performed by: INTERNAL MEDICINE

## 2019-11-26 PROCEDURE — 83550 IRON BINDING TEST: CPT

## 2019-11-26 PROCEDURE — 92523 SPEECH SOUND LANG COMPREHEN: CPT

## 2019-11-26 PROCEDURE — 2580000003 HC RX 258: Performed by: INTERNAL MEDICINE

## 2019-11-26 PROCEDURE — 2060000000 HC ICU INTERMEDIATE R&B

## 2019-11-26 PROCEDURE — 99231 SBSQ HOSP IP/OBS SF/LOW 25: CPT | Performed by: INTERNAL MEDICINE

## 2019-11-26 PROCEDURE — 36415 COLL VENOUS BLD VENIPUNCTURE: CPT

## 2019-11-26 PROCEDURE — 93970 EXTREMITY STUDY: CPT

## 2019-11-26 PROCEDURE — 82728 ASSAY OF FERRITIN: CPT

## 2019-11-26 PROCEDURE — 83540 ASSAY OF IRON: CPT

## 2019-11-26 PROCEDURE — 2500000003 HC RX 250 WO HCPCS: Performed by: INTERNAL MEDICINE

## 2019-11-26 PROCEDURE — 82962 GLUCOSE BLOOD TEST: CPT

## 2019-11-26 PROCEDURE — 6370000000 HC RX 637 (ALT 250 FOR IP): Performed by: STUDENT IN AN ORGANIZED HEALTH CARE EDUCATION/TRAINING PROGRAM

## 2019-11-26 RX ORDER — ACETAMINOPHEN 325 MG/1
650 TABLET ORAL EVERY 8 HOURS PRN
Status: DISCONTINUED | OUTPATIENT
Start: 2019-11-26 | End: 2019-11-27 | Stop reason: HOSPADM

## 2019-11-26 RX ORDER — ATORVASTATIN CALCIUM 40 MG/1
40 TABLET, FILM COATED ORAL NIGHTLY
Qty: 30 TABLET | Refills: 3 | Status: CANCELLED | OUTPATIENT
Start: 2019-11-26

## 2019-11-26 RX ORDER — LABETALOL HYDROCHLORIDE 5 MG/ML
10 INJECTION, SOLUTION INTRAVENOUS EVERY 4 HOURS PRN
Status: DISCONTINUED | OUTPATIENT
Start: 2019-11-26 | End: 2019-11-27 | Stop reason: HOSPADM

## 2019-11-26 RX ORDER — NIMODIPINE 30 MG/1
30 CAPSULE, LIQUID FILLED ORAL
Status: DISCONTINUED | OUTPATIENT
Start: 2019-11-26 | End: 2019-11-26

## 2019-11-26 RX ORDER — LANOLIN ALCOHOL/MO/W.PET/CERES
50 CREAM (GRAM) TOPICAL DAILY
Status: DISCONTINUED | OUTPATIENT
Start: 2019-11-26 | End: 2019-11-27 | Stop reason: HOSPADM

## 2019-11-26 RX ORDER — M-VIT,TX,IRON,MINS/CALC/FOLIC 27MG-0.4MG
1 TABLET ORAL DAILY
Status: DISCONTINUED | OUTPATIENT
Start: 2019-11-26 | End: 2019-11-27 | Stop reason: HOSPADM

## 2019-11-26 RX ORDER — ANALGESIC BALM 1.74; 4.06 G/29G; G/29G
OINTMENT TOPICAL DAILY
Status: DISCONTINUED | OUTPATIENT
Start: 2019-11-26 | End: 2019-11-27 | Stop reason: HOSPADM

## 2019-11-26 RX ORDER — LISINOPRIL 10 MG/1
10 TABLET ORAL DAILY
Qty: 30 TABLET | Refills: 3 | Status: CANCELLED | OUTPATIENT
Start: 2019-11-27

## 2019-11-26 RX ADMIN — ATORVASTATIN CALCIUM 40 MG: 40 TABLET, FILM COATED ORAL at 20:04

## 2019-11-26 RX ADMIN — LABETALOL HYDROCHLORIDE 10 MG: 5 INJECTION INTRAVENOUS at 20:04

## 2019-11-26 RX ADMIN — MENTHOL AND METHYL SALICYLATE: 7.6; 29 OINTMENT TOPICAL at 14:42

## 2019-11-26 RX ADMIN — Medication 50 MG: at 14:41

## 2019-11-26 RX ADMIN — Medication 10 ML: at 20:04

## 2019-11-26 RX ADMIN — ACETAMINOPHEN 1000 MG: 500 TABLET ORAL at 06:59

## 2019-11-26 RX ADMIN — Medication 10 ML: at 10:39

## 2019-11-26 RX ADMIN — LISINOPRIL 10 MG: 10 TABLET ORAL at 10:39

## 2019-11-26 RX ADMIN — MULTIPLE VITAMINS W/ MINERALS TAB 1 TABLET: TAB at 14:42

## 2019-11-26 ASSESSMENT — PAIN DESCRIPTION - DESCRIPTORS
DESCRIPTORS: SHARP
DESCRIPTORS: ACHING;DISCOMFORT

## 2019-11-26 ASSESSMENT — PAIN DESCRIPTION - FREQUENCY
FREQUENCY: CONTINUOUS
FREQUENCY: CONTINUOUS

## 2019-11-26 ASSESSMENT — PAIN DESCRIPTION - ONSET
ONSET: ON-GOING
ONSET: ON-GOING

## 2019-11-26 ASSESSMENT — PAIN DESCRIPTION - PROGRESSION
CLINICAL_PROGRESSION: NOT CHANGED

## 2019-11-26 ASSESSMENT — PAIN DESCRIPTION - PAIN TYPE
TYPE: ACUTE PAIN
TYPE: ACUTE PAIN

## 2019-11-26 ASSESSMENT — PAIN DESCRIPTION - LOCATION
LOCATION: LEG;KNEE
LOCATION: LEG

## 2019-11-26 ASSESSMENT — PAIN SCALES - GENERAL
PAINLEVEL_OUTOF10: 0
PAINLEVEL_OUTOF10: 6
PAINLEVEL_OUTOF10: 4
PAINLEVEL_OUTOF10: 4

## 2019-11-26 ASSESSMENT — PAIN DESCRIPTION - ORIENTATION
ORIENTATION: LEFT
ORIENTATION: LEFT

## 2019-11-26 ASSESSMENT — PAIN - FUNCTIONAL ASSESSMENT
PAIN_FUNCTIONAL_ASSESSMENT: ACTIVITIES ARE NOT PREVENTED
PAIN_FUNCTIONAL_ASSESSMENT: ACTIVITIES ARE NOT PREVENTED

## 2019-11-27 VITALS
HEIGHT: 65 IN | SYSTOLIC BLOOD PRESSURE: 138 MMHG | TEMPERATURE: 98 F | RESPIRATION RATE: 16 BRPM | BODY MASS INDEX: 27.32 KG/M2 | WEIGHT: 164 LBS | DIASTOLIC BLOOD PRESSURE: 78 MMHG | HEART RATE: 80 BPM | OXYGEN SATURATION: 96 %

## 2019-11-27 PROCEDURE — 6370000000 HC RX 637 (ALT 250 FOR IP): Performed by: INTERNAL MEDICINE

## 2019-11-27 PROCEDURE — 2580000003 HC RX 258: Performed by: INTERNAL MEDICINE

## 2019-11-27 PROCEDURE — 99231 SBSQ HOSP IP/OBS SF/LOW 25: CPT | Performed by: INTERNAL MEDICINE

## 2019-11-27 RX ORDER — AMLODIPINE BESYLATE 2.5 MG/1
2.5 TABLET ORAL DAILY
Status: DISCONTINUED | OUTPATIENT
Start: 2019-11-27 | End: 2019-11-27 | Stop reason: HOSPADM

## 2019-11-27 RX ORDER — PYRIDOXINE HCL (VITAMIN B6) 50 MG
50 TABLET ORAL DAILY
Qty: 30 TABLET | Refills: 3 | Status: SHIPPED | OUTPATIENT
Start: 2019-11-27 | End: 2020-01-10 | Stop reason: SDUPTHER

## 2019-11-27 RX ORDER — ATORVASTATIN CALCIUM 40 MG/1
40 TABLET, FILM COATED ORAL NIGHTLY
Qty: 30 TABLET | Refills: 3 | Status: SHIPPED | OUTPATIENT
Start: 2019-11-27 | End: 2020-01-10 | Stop reason: SDUPTHER

## 2019-11-27 RX ORDER — LISINOPRIL 10 MG/1
10 TABLET ORAL DAILY
Qty: 30 TABLET | Refills: 3 | Status: SHIPPED | OUTPATIENT
Start: 2019-11-28 | End: 2020-01-10 | Stop reason: SDUPTHER

## 2019-11-27 RX ORDER — AMLODIPINE BESYLATE 2.5 MG/1
2.5 TABLET ORAL DAILY
Qty: 30 TABLET | Refills: 3 | Status: SHIPPED | OUTPATIENT
Start: 2019-11-28 | End: 2020-01-10 | Stop reason: SDUPTHER

## 2019-11-27 RX ORDER — AMLODIPINE BESYLATE 10 MG/1
10 TABLET ORAL DAILY
Status: DISCONTINUED | OUTPATIENT
Start: 2019-11-27 | End: 2019-11-27

## 2019-11-27 RX ADMIN — LISINOPRIL 10 MG: 10 TABLET ORAL at 09:33

## 2019-11-27 RX ADMIN — Medication 10 ML: at 09:37

## 2019-11-27 RX ADMIN — MULTIPLE VITAMINS W/ MINERALS TAB 1 TABLET: TAB at 09:34

## 2019-11-27 RX ADMIN — MENTHOL AND METHYL SALICYLATE: 7.6; 29 OINTMENT TOPICAL at 09:37

## 2019-11-27 RX ADMIN — Medication 50 MG: at 09:33

## 2019-11-27 RX ADMIN — ACETAMINOPHEN 650 MG: 325 TABLET, FILM COATED ORAL at 02:36

## 2019-11-27 RX ADMIN — AMLODIPINE BESYLATE 2.5 MG: 2.5 TABLET ORAL at 10:39

## 2019-11-27 ASSESSMENT — PAIN SCALES - GENERAL
PAINLEVEL_OUTOF10: 0
PAINLEVEL_OUTOF10: 5

## 2019-11-27 ASSESSMENT — PAIN DESCRIPTION - PROGRESSION
CLINICAL_PROGRESSION: NOT CHANGED
CLINICAL_PROGRESSION: NOT CHANGED

## 2019-11-27 ASSESSMENT — PAIN DESCRIPTION - LOCATION: LOCATION: HEAD

## 2019-11-29 LAB
BLOOD CULTURE, ROUTINE: NORMAL
CULTURE, BLOOD 2: NORMAL

## 2019-12-13 ENCOUNTER — OFFICE VISIT (OUTPATIENT)
Dept: INTERNAL MEDICINE | Age: 69
End: 2019-12-13
Payer: COMMERCIAL

## 2019-12-13 VITALS
WEIGHT: 160.3 LBS | BODY MASS INDEX: 29.5 KG/M2 | HEIGHT: 62 IN | SYSTOLIC BLOOD PRESSURE: 122 MMHG | TEMPERATURE: 97.8 F | HEART RATE: 70 BPM | RESPIRATION RATE: 16 BRPM | DIASTOLIC BLOOD PRESSURE: 68 MMHG | OXYGEN SATURATION: 97 %

## 2019-12-13 DIAGNOSIS — M51.26 LUMBAR DISC HERNIATION: ICD-10-CM

## 2019-12-13 DIAGNOSIS — F41.0 PANIC ATTACK: Primary | ICD-10-CM

## 2019-12-13 DIAGNOSIS — I10 ESSENTIAL HYPERTENSION: ICD-10-CM

## 2019-12-13 PROCEDURE — 99214 OFFICE O/P EST MOD 30 MIN: CPT | Performed by: INTERNAL MEDICINE

## 2019-12-13 PROCEDURE — 99212 OFFICE O/P EST SF 10 MIN: CPT | Performed by: INTERNAL MEDICINE

## 2019-12-13 ASSESSMENT — ENCOUNTER SYMPTOMS
EYE ITCHING: 0
TROUBLE SWALLOWING: 0
ABDOMINAL PAIN: 0
WHEEZING: 0
RHINORRHEA: 0
BLOOD IN STOOL: 0
SHORTNESS OF BREATH: 0
NAUSEA: 0
EYE DISCHARGE: 0
COUGH: 0
VOMITING: 0
CHEST TIGHTNESS: 0
CONSTIPATION: 0
DIARRHEA: 0

## 2019-12-23 PROBLEM — F41.0 PANIC ATTACKS: Status: ACTIVE | Noted: 2019-12-23

## 2020-01-06 ENCOUNTER — TELEPHONE (OUTPATIENT)
Dept: INTERNAL MEDICINE | Age: 70
End: 2020-01-06

## 2020-01-06 NOTE — TELEPHONE ENCOUNTER
Received call from [facility], regarding patient's disability form. Would like to clarify some information on the form. Per form, pt was cleared to go back to work 12/13/19. Form was received 12/23/19. Pt has not yet returned to work. Asking if patient has been seen since 12/13/19 and if so need further clarification regarding physical/cognitive that is affecting patient's ability to return to work. Called and spoke with patient via phone. Pt states she did not know she was cleared to return to work. States she had been getting home PT & OT and that someone was supposed to come to her home and release her from therapy. [facility] is who was seeing her. Pt also called and states she did call [employer]  and was told that paperwork was pending. Notified I would call her back tomorrow after talking with home care and Keily Johnson.

## 2020-01-08 ENCOUNTER — TELEPHONE (OUTPATIENT)
Dept: INTERNAL MEDICINE | Age: 70
End: 2020-01-08

## 2020-01-08 NOTE — TELEPHONE ENCOUNTER
Please refer to previous telephone encounter for additional information. Spoke with Saranya. States pt was admitted from 11/27/19 to 12/12/19. Spoke with  Bere Mariano from [facility]. Notified that patient will be discharged from [address] on 1/11/2020. Spoke with Kevin Reveles from [employer].  She needs letter or updated paperwork stating why patient could not work since she was admitted to hospital.

## 2020-01-10 ENCOUNTER — TELEPHONE (OUTPATIENT)
Dept: INTERNAL MEDICINE | Age: 70
End: 2020-01-10

## 2020-01-10 ENCOUNTER — OFFICE VISIT (OUTPATIENT)
Dept: INTERNAL MEDICINE | Age: 70
End: 2020-01-10
Payer: COMMERCIAL

## 2020-01-10 VITALS
BODY MASS INDEX: 29.98 KG/M2 | WEIGHT: 162.9 LBS | RESPIRATION RATE: 18 BRPM | DIASTOLIC BLOOD PRESSURE: 70 MMHG | HEIGHT: 62 IN | SYSTOLIC BLOOD PRESSURE: 127 MMHG | HEART RATE: 72 BPM | TEMPERATURE: 97.5 F

## 2020-01-10 PROCEDURE — G8400 PT W/DXA NO RESULTS DOC: HCPCS | Performed by: ANESTHESIOLOGY

## 2020-01-10 PROCEDURE — 99214 OFFICE O/P EST MOD 30 MIN: CPT | Performed by: ANESTHESIOLOGY

## 2020-01-10 PROCEDURE — G8484 FLU IMMUNIZE NO ADMIN: HCPCS | Performed by: ANESTHESIOLOGY

## 2020-01-10 PROCEDURE — 4040F PNEUMOC VAC/ADMIN/RCVD: CPT | Performed by: ANESTHESIOLOGY

## 2020-01-10 PROCEDURE — 1123F ACP DISCUSS/DSCN MKR DOCD: CPT | Performed by: ANESTHESIOLOGY

## 2020-01-10 PROCEDURE — 1090F PRES/ABSN URINE INCON ASSESS: CPT | Performed by: ANESTHESIOLOGY

## 2020-01-10 PROCEDURE — G8427 DOCREV CUR MEDS BY ELIG CLIN: HCPCS | Performed by: ANESTHESIOLOGY

## 2020-01-10 PROCEDURE — 3017F COLORECTAL CA SCREEN DOC REV: CPT | Performed by: ANESTHESIOLOGY

## 2020-01-10 PROCEDURE — G8417 CALC BMI ABV UP PARAM F/U: HCPCS | Performed by: ANESTHESIOLOGY

## 2020-01-10 PROCEDURE — 1036F TOBACCO NON-USER: CPT | Performed by: ANESTHESIOLOGY

## 2020-01-10 PROCEDURE — 99212 OFFICE O/P EST SF 10 MIN: CPT | Performed by: ANESTHESIOLOGY

## 2020-01-10 RX ORDER — PYRIDOXINE HCL (VITAMIN B6) 50 MG
50 TABLET ORAL DAILY
Qty: 30 TABLET | Refills: 3 | Status: ON HOLD
Start: 2020-01-10 | End: 2021-01-16

## 2020-01-10 RX ORDER — AMLODIPINE BESYLATE 2.5 MG/1
2.5 TABLET ORAL DAILY
Qty: 30 TABLET | Refills: 3 | Status: ON HOLD
Start: 2020-01-10 | End: 2021-01-16

## 2020-01-10 RX ORDER — LISINOPRIL 10 MG/1
10 TABLET ORAL DAILY
Qty: 30 TABLET | Refills: 3 | Status: ON HOLD
Start: 2020-01-10 | End: 2021-01-16

## 2020-01-10 RX ORDER — ATORVASTATIN CALCIUM 40 MG/1
40 TABLET, FILM COATED ORAL NIGHTLY
Qty: 30 TABLET | Refills: 3 | Status: ON HOLD
Start: 2020-01-10 | End: 2021-01-16

## 2020-01-10 ASSESSMENT — ENCOUNTER SYMPTOMS
SHORTNESS OF BREATH: 0
ABDOMINAL PAIN: 0
BACK PAIN: 0

## 2020-01-10 ASSESSMENT — PATIENT HEALTH QUESTIONNAIRE - PHQ9
1. LITTLE INTEREST OR PLEASURE IN DOING THINGS: 0
SUM OF ALL RESPONSES TO PHQ9 QUESTIONS 1 & 2: 0
2. FEELING DOWN, DEPRESSED OR HOPELESS: 0
SUM OF ALL RESPONSES TO PHQ QUESTIONS 1-9: 0
SUM OF ALL RESPONSES TO PHQ QUESTIONS 1-9: 0

## 2020-01-10 NOTE — PROGRESS NOTES
Patient verbalized understanding of office instructions. She will call with questions or concerns. She was given discharge instructions, and all questions were fully answered. Return to work letter was given to pt . Dr. Arlene Moulton will complete form for [employer] for work certification and send to company per instructions . Informed pt we will send her confirmation of form sent to her.     Pt's form for  work certifications were sent per request

## 2020-01-10 NOTE — PROGRESS NOTES
[facility]  Internal Medicine Clinic    Attending Physician Statement:  Francis Sauceda M.D., F.A.C.P. I have discussed the case, including pertinent history and exam findings with the resident. I have seen and examined the patient and the key elements of the encounter have been performed by me. I agree with the assessment, plan and orders as documented by the resident. Patient is seen for hospital fu visit today. Last hospital/office notes reviewed, relative labs and imaging. Hospitalization reviewed:    Hospital charts reviewed, including other providers notes, relevant labs and imaging. Doubt CVA, intermittent hands/feet b/l numb-- ?panick attacks/hyperventilation  Seen by neuro and NS opinions noted  MRI:  Subcentimeter focus of abnormal signal associated with posterior   left frontal subcortical white matter which may represent a focus of   hemosiderin and evolving area of parenchymal hemorrhage versus   cerebral cavernous malformation. Short-term follow-up may be helpful   for further evaluation.       3. Moderate burden of nonspecific foci of abnormal signal scattered   throughout the white matter are suggestive of areas of chronic   microvascular ischemic change      Also doubt clinically sginificant spinal stenosis-- neck and lumbar  --can't rule out cervical though  They didn't recommend fu-- so doubt significance of abnormal MRI  Also CTA head didn't show bleed for AVM:  \"  . There is mild stenosis   involving distal basilar artery with bulbous distal tip. No evidence   of intracranial aneurysm or AVM.       Age related  Likely microvascular disease chronic  ldl 132, aic 6.4 (?metformin if worsens)  ?need for statin- started in hospital-- will continue for now  /67 in house    I doubted acute CVA based on symptoms  -- incidental findings noted  Cervical myelopathy rule out -- cervical hyperext - NO reproduction of numbness  degenerative disc disease and severe

## 2020-05-12 ENCOUNTER — TELEPHONE (OUTPATIENT)
Dept: INTERNAL MEDICINE | Age: 70
End: 2020-05-12

## 2020-05-12 NOTE — TELEPHONE ENCOUNTER
Randy from [facility] is checking on the status of paper that was sent. Please call him at [phone].

## 2021-01-15 ENCOUNTER — APPOINTMENT (OUTPATIENT)
Dept: GENERAL RADIOLOGY | Age: 71
DRG: 392 | End: 2021-01-15
Payer: COMMERCIAL

## 2021-01-15 ENCOUNTER — HOSPITAL ENCOUNTER (INPATIENT)
Age: 71
LOS: 2 days | Discharge: HOME | DRG: 392 | End: 2021-01-18
Attending: EMERGENCY MEDICINE | Admitting: INTERNAL MEDICINE
Payer: COMMERCIAL

## 2021-01-15 DIAGNOSIS — R53.1 GENERAL WEAKNESS: ICD-10-CM

## 2021-01-15 DIAGNOSIS — R53.1 WEAKNESS: ICD-10-CM

## 2021-01-15 DIAGNOSIS — R19.7 NAUSEA VOMITING AND DIARRHEA: Primary | ICD-10-CM

## 2021-01-15 DIAGNOSIS — E78.5 HYPERLIPIDEMIA, UNSPECIFIED HYPERLIPIDEMIA TYPE: ICD-10-CM

## 2021-01-15 DIAGNOSIS — R11.2 NAUSEA VOMITING AND DIARRHEA: Primary | ICD-10-CM

## 2021-01-15 PROCEDURE — 96375 TX/PRO/DX INJ NEW DRUG ADDON: CPT

## 2021-01-15 PROCEDURE — 2580000003 HC RX 258: Performed by: EMERGENCY MEDICINE

## 2021-01-15 PROCEDURE — 80053 COMPREHEN METABOLIC PANEL: CPT

## 2021-01-15 PROCEDURE — 96374 THER/PROPH/DIAG INJ IV PUSH: CPT

## 2021-01-15 PROCEDURE — 85025 COMPLETE CBC W/AUTO DIFF WBC: CPT

## 2021-01-15 PROCEDURE — 84484 ASSAY OF TROPONIN QUANT: CPT

## 2021-01-15 PROCEDURE — 83605 ASSAY OF LACTIC ACID: CPT

## 2021-01-15 PROCEDURE — 93005 ELECTROCARDIOGRAM TRACING: CPT | Performed by: EMERGENCY MEDICINE

## 2021-01-15 PROCEDURE — 71045 X-RAY EXAM CHEST 1 VIEW: CPT

## 2021-01-15 PROCEDURE — 99285 EMERGENCY DEPT VISIT HI MDM: CPT

## 2021-01-15 PROCEDURE — 83690 ASSAY OF LIPASE: CPT

## 2021-01-15 RX ORDER — 0.9 % SODIUM CHLORIDE 0.9 %
1000 INTRAVENOUS SOLUTION INTRAVENOUS ONCE
Status: COMPLETED | OUTPATIENT
Start: 2021-01-15 | End: 2021-01-16

## 2021-01-15 RX ADMIN — SODIUM CHLORIDE 1000 ML: 9 INJECTION, SOLUTION INTRAVENOUS at 23:57

## 2021-01-15 ASSESSMENT — PAIN SCALES - GENERAL: PAINLEVEL_OUTOF10: 5

## 2021-01-15 ASSESSMENT — PAIN DESCRIPTION - DESCRIPTORS: DESCRIPTORS: ACHING

## 2021-01-15 ASSESSMENT — PAIN DESCRIPTION - LOCATION: LOCATION: ABDOMEN

## 2021-01-15 ASSESSMENT — PAIN DESCRIPTION - ONSET: ONSET: SUDDEN

## 2021-01-15 ASSESSMENT — PAIN DESCRIPTION - PAIN TYPE: TYPE: ACUTE PAIN

## 2021-01-15 ASSESSMENT — PAIN DESCRIPTION - FREQUENCY: FREQUENCY: CONTINUOUS

## 2021-01-16 ENCOUNTER — APPOINTMENT (OUTPATIENT)
Dept: CT IMAGING | Age: 71
DRG: 392 | End: 2021-01-16
Payer: COMMERCIAL

## 2021-01-16 PROBLEM — K52.9 GASTROENTERITIS: Status: ACTIVE | Noted: 2021-01-16

## 2021-01-16 PROBLEM — R53.1 WEAKNESS: Status: ACTIVE | Noted: 2021-01-16

## 2021-01-16 LAB
ACETAMINOPHEN LEVEL: <5 MCG/ML (ref 10–30)
ALBUMIN SERPL-MCNC: 4.5 G/DL (ref 3.5–5.2)
ALP BLD-CCNC: 90 U/L (ref 35–104)
ALT SERPL-CCNC: 9 U/L (ref 0–32)
AMPHETAMINE SCREEN, URINE: NOT DETECTED
ANION GAP SERPL CALCULATED.3IONS-SCNC: 13 MMOL/L (ref 7–16)
AST SERPL-CCNC: 15 U/L (ref 0–31)
ATYPICAL LYMPHOCYTE RELATIVE PERCENT: 0.9 % (ref 0–4)
BACTERIA: ABNORMAL /HPF
BACTERIA: NORMAL /HPF
BARBITURATE SCREEN URINE: NOT DETECTED
BASOPHILS ABSOLUTE: 0 E9/L (ref 0–0.2)
BASOPHILS RELATIVE PERCENT: 0.1 % (ref 0–2)
BENZODIAZEPINE SCREEN, URINE: NOT DETECTED
BILIRUB SERPL-MCNC: 0.3 MG/DL (ref 0–1.2)
BILIRUBIN URINE: NEGATIVE
BILIRUBIN URINE: NEGATIVE
BLOOD, URINE: NEGATIVE
BLOOD, URINE: NORMAL
BUN BLDV-MCNC: 14 MG/DL (ref 8–23)
CALCIUM SERPL-MCNC: 9.1 MG/DL (ref 8.6–10.2)
CANNABINOID SCREEN URINE: NOT DETECTED
CHLORIDE BLD-SCNC: 103 MMOL/L (ref 98–107)
CHOLESTEROL, TOTAL: 186 MG/DL (ref 0–199)
CLARITY: CLEAR
CLARITY: CLEAR
CO2: 26 MMOL/L (ref 22–29)
COCAINE METABOLITE SCREEN URINE: NOT DETECTED
COLOR: YELLOW
COLOR: YELLOW
CREAT SERPL-MCNC: 1 MG/DL (ref 0.5–1)
EKG ATRIAL RATE: 83 BPM
EKG P AXIS: 40 DEGREES
EKG P-R INTERVAL: 154 MS
EKG Q-T INTERVAL: 496 MS
EKG QRS DURATION: 78 MS
EKG QTC CALCULATION (BAZETT): 582 MS
EKG R AXIS: 36 DEGREES
EKG T AXIS: 49 DEGREES
EKG VENTRICULAR RATE: 83 BPM
EOSINOPHILS ABSOLUTE: 0 E9/L (ref 0.05–0.5)
EOSINOPHILS RELATIVE PERCENT: 0.1 % (ref 0–6)
EPITHELIAL CELLS, UA: ABNORMAL /HPF
ETHANOL: <10 MG/DL (ref 0–0.08)
FENTANYL SCREEN, URINE: NOT DETECTED
GFR AFRICAN AMERICAN: >60
GFR NON-AFRICAN AMERICAN: 55 ML/MIN/1.73
GLUCOSE BLD-MCNC: 135 MG/DL (ref 74–99)
GLUCOSE URINE: NEGATIVE MG/DL
GLUCOSE URINE: NEGATIVE MG/DL
HCT VFR BLD CALC: 45.6 % (ref 34–48)
HDLC SERPL-MCNC: 56 MG/DL
HEMOGLOBIN: 14.3 G/DL (ref 11.5–15.5)
KETONES, URINE: NEGATIVE MG/DL
KETONES, URINE: NEGATIVE MG/DL
LACTIC ACID: 1.3 MMOL/L (ref 0.5–2.2)
LACTIC ACID: 2.5 MMOL/L (ref 0.5–2.2)
LDL CHOLESTEROL CALCULATED: 117 MG/DL (ref 0–99)
LEUKOCYTE ESTERASE, URINE: NEGATIVE
LEUKOCYTE ESTERASE, URINE: NEGATIVE
LIPASE: 34 U/L (ref 13–60)
LYMPHOCYTES ABSOLUTE: 0.84 E9/L (ref 1.5–4)
LYMPHOCYTES RELATIVE PERCENT: 7 % (ref 20–42)
Lab: NORMAL
MCH RBC QN AUTO: 30.8 PG (ref 26–35)
MCHC RBC AUTO-ENTMCNC: 31.4 % (ref 32–34.5)
MCV RBC AUTO: 98.3 FL (ref 80–99.9)
METHADONE SCREEN, URINE: NOT DETECTED
MONOCYTES ABSOLUTE: 0.42 E9/L (ref 0.1–0.95)
MONOCYTES RELATIVE PERCENT: 3.5 % (ref 2–12)
NEUTROPHILS ABSOLUTE: 9.35 E9/L (ref 1.8–7.3)
NEUTROPHILS RELATIVE PERCENT: 88.7 % (ref 43–80)
NITRITE, URINE: NEGATIVE
NITRITE, URINE: NEGATIVE
OPIATE SCREEN URINE: NOT DETECTED
OXYCODONE URINE: NOT DETECTED
PDW BLD-RTO: 14.2 FL (ref 11.5–15)
PH UA: 5.5 (ref 5–9)
PH UA: 5.5 (ref 5–9)
PHENCYCLIDINE SCREEN URINE: NOT DETECTED
PLATELET # BLD: 182 E9/L (ref 130–450)
PMV BLD AUTO: 10.5 FL (ref 7–12)
POTASSIUM SERPL-SCNC: 4.2 MMOL/L (ref 3.5–5)
PROTEIN UA: ABNORMAL MG/DL
PROTEIN UA: NORMAL MG/DL
RBC # BLD: 4.64 E12/L (ref 3.5–5.5)
RBC # BLD: NORMAL 10*6/UL
RBC UA: ABNORMAL /HPF (ref 0–2)
RBC UA: NORMAL /HPF (ref 0–2)
SALICYLATE, SERUM: <0.3 MG/DL (ref 0–30)
SARS-COV-2, NAAT: NOT DETECTED
SODIUM BLD-SCNC: 142 MMOL/L (ref 132–146)
SPECIFIC GRAVITY UA: 1.02 (ref 1–1.03)
SPECIFIC GRAVITY UA: 1.02 (ref 1–1.03)
TOTAL PROTEIN: 8 G/DL (ref 6.4–8.3)
TRICYCLIC ANTIDEPRESSANTS SCREEN SERUM: NEGATIVE NG/ML
TRIGL SERPL-MCNC: 66 MG/DL (ref 0–149)
TROPONIN: <0.01 NG/ML (ref 0–0.03)
TROPONIN: <0.01 NG/ML (ref 0–0.03)
UROBILINOGEN, URINE: 0.2 E.U./DL
UROBILINOGEN, URINE: 0.2 E.U./DL
VLDLC SERPL CALC-MCNC: 13 MG/DL
WBC # BLD: 10.5 E9/L (ref 4.5–11.5)
WBC UA: ABNORMAL /HPF (ref 0–5)
WBC UA: NORMAL /HPF (ref 0–5)

## 2021-01-16 PROCEDURE — 99231 SBSQ HOSP IP/OBS SF/LOW 25: CPT | Performed by: INTERNAL MEDICINE

## 2021-01-16 PROCEDURE — 6370000000 HC RX 637 (ALT 250 FOR IP): Performed by: EMERGENCY MEDICINE

## 2021-01-16 PROCEDURE — 6360000002 HC RX W HCPCS: Performed by: INTERNAL MEDICINE

## 2021-01-16 PROCEDURE — 2060000000 HC ICU INTERMEDIATE R&B

## 2021-01-16 PROCEDURE — 74177 CT ABD & PELVIS W/CONTRAST: CPT

## 2021-01-16 PROCEDURE — 83605 ASSAY OF LACTIC ACID: CPT

## 2021-01-16 PROCEDURE — 2500000003 HC RX 250 WO HCPCS: Performed by: EMERGENCY MEDICINE

## 2021-01-16 PROCEDURE — U0002 COVID-19 LAB TEST NON-CDC: HCPCS

## 2021-01-16 PROCEDURE — 2580000003 HC RX 258: Performed by: EMERGENCY MEDICINE

## 2021-01-16 PROCEDURE — 6370000000 HC RX 637 (ALT 250 FOR IP): Performed by: INTERNAL MEDICINE

## 2021-01-16 PROCEDURE — 2580000003 HC RX 258: Performed by: INTERNAL MEDICINE

## 2021-01-16 PROCEDURE — 2500000003 HC RX 250 WO HCPCS: Performed by: INTERNAL MEDICINE

## 2021-01-16 PROCEDURE — 80307 DRUG TEST PRSMV CHEM ANLYZR: CPT

## 2021-01-16 PROCEDURE — G0480 DRUG TEST DEF 1-7 CLASSES: HCPCS

## 2021-01-16 PROCEDURE — G0378 HOSPITAL OBSERVATION PER HR: HCPCS

## 2021-01-16 PROCEDURE — 80061 LIPID PANEL: CPT

## 2021-01-16 PROCEDURE — 81001 URINALYSIS AUTO W/SCOPE: CPT

## 2021-01-16 PROCEDURE — 84484 ASSAY OF TROPONIN QUANT: CPT

## 2021-01-16 PROCEDURE — 87088 URINE BACTERIA CULTURE: CPT

## 2021-01-16 PROCEDURE — 6360000002 HC RX W HCPCS: Performed by: EMERGENCY MEDICINE

## 2021-01-16 PROCEDURE — 6360000004 HC RX CONTRAST MEDICATION: Performed by: RADIOLOGY

## 2021-01-16 PROCEDURE — 87040 BLOOD CULTURE FOR BACTERIA: CPT

## 2021-01-16 PROCEDURE — 36415 COLL VENOUS BLD VENIPUNCTURE: CPT

## 2021-01-16 RX ORDER — ONDANSETRON 2 MG/ML
4 INJECTION INTRAMUSCULAR; INTRAVENOUS EVERY 6 HOURS PRN
Status: DISCONTINUED | OUTPATIENT
Start: 2021-01-16 | End: 2021-01-17

## 2021-01-16 RX ORDER — ACETAMINOPHEN 325 MG/1
650 TABLET ORAL EVERY 6 HOURS PRN
Status: DISCONTINUED | OUTPATIENT
Start: 2021-01-16 | End: 2021-01-18 | Stop reason: HOSPADM

## 2021-01-16 RX ORDER — ACETAMINOPHEN 650 MG/1
650 SUPPOSITORY RECTAL EVERY 6 HOURS PRN
Status: DISCONTINUED | OUTPATIENT
Start: 2021-01-16 | End: 2021-01-18 | Stop reason: HOSPADM

## 2021-01-16 RX ORDER — PROMETHAZINE HYDROCHLORIDE 25 MG/1
12.5 TABLET ORAL EVERY 6 HOURS PRN
Status: DISCONTINUED | OUTPATIENT
Start: 2021-01-16 | End: 2021-01-17

## 2021-01-16 RX ORDER — POLYETHYLENE GLYCOL 3350 17 G/17G
17 POWDER, FOR SOLUTION ORAL DAILY PRN
Status: DISCONTINUED | OUTPATIENT
Start: 2021-01-16 | End: 2021-01-18 | Stop reason: HOSPADM

## 2021-01-16 RX ORDER — 0.9 % SODIUM CHLORIDE 0.9 %
500 INTRAVENOUS SOLUTION INTRAVENOUS ONCE
Status: COMPLETED | OUTPATIENT
Start: 2021-01-16 | End: 2021-01-16

## 2021-01-16 RX ORDER — SODIUM CHLORIDE 9 MG/ML
INJECTION, SOLUTION INTRAVENOUS CONTINUOUS
Status: DISCONTINUED | OUTPATIENT
Start: 2021-01-16 | End: 2021-01-17

## 2021-01-16 RX ORDER — SODIUM CHLORIDE 0.9 % (FLUSH) 0.9 %
10 SYRINGE (ML) INJECTION PRN
Status: DISCONTINUED | OUTPATIENT
Start: 2021-01-16 | End: 2021-01-18 | Stop reason: HOSPADM

## 2021-01-16 RX ORDER — ONDANSETRON 2 MG/ML
4 INJECTION INTRAMUSCULAR; INTRAVENOUS ONCE
Status: COMPLETED | OUTPATIENT
Start: 2021-01-16 | End: 2021-01-16

## 2021-01-16 RX ORDER — DICYCLOMINE HYDROCHLORIDE 10 MG/1
10 CAPSULE ORAL ONCE
Status: COMPLETED | OUTPATIENT
Start: 2021-01-16 | End: 2021-01-16

## 2021-01-16 RX ORDER — SODIUM CHLORIDE 0.9 % (FLUSH) 0.9 %
10 SYRINGE (ML) INJECTION EVERY 12 HOURS SCHEDULED
Status: DISCONTINUED | OUTPATIENT
Start: 2021-01-16 | End: 2021-01-18 | Stop reason: HOSPADM

## 2021-01-16 RX ORDER — ATORVASTATIN CALCIUM 40 MG/1
40 TABLET, FILM COATED ORAL NIGHTLY
Status: DISCONTINUED | OUTPATIENT
Start: 2021-01-16 | End: 2021-01-18 | Stop reason: HOSPADM

## 2021-01-16 RX ADMIN — ENOXAPARIN SODIUM 40 MG: 40 INJECTION SUBCUTANEOUS at 09:40

## 2021-01-16 RX ADMIN — FAMOTIDINE 20 MG: 10 INJECTION INTRAVENOUS at 21:21

## 2021-01-16 RX ADMIN — ATORVASTATIN CALCIUM 40 MG: 80 TABLET, FILM COATED ORAL at 21:21

## 2021-01-16 RX ADMIN — ACETAMINOPHEN 650 MG: 325 TABLET ORAL at 17:16

## 2021-01-16 RX ADMIN — SODIUM CHLORIDE: 9 INJECTION, SOLUTION INTRAVENOUS at 09:32

## 2021-01-16 RX ADMIN — FAMOTIDINE 20 MG: 10 INJECTION INTRAVENOUS at 09:40

## 2021-01-16 RX ADMIN — SODIUM CHLORIDE, PRESERVATIVE FREE 10 ML: 5 INJECTION INTRAVENOUS at 21:21

## 2021-01-16 RX ADMIN — ONDANSETRON 4 MG: 2 INJECTION INTRAMUSCULAR; INTRAVENOUS at 04:21

## 2021-01-16 RX ADMIN — SODIUM CHLORIDE 500 ML: 9 INJECTION, SOLUTION INTRAVENOUS at 04:20

## 2021-01-16 RX ADMIN — DICYCLOMINE HYDROCHLORIDE 10 MG: 10 CAPSULE ORAL at 04:28

## 2021-01-16 RX ADMIN — FAMOTIDINE 20 MG: 10 INJECTION INTRAVENOUS at 04:23

## 2021-01-16 RX ADMIN — IOPAMIDOL 90 ML: 755 INJECTION, SOLUTION INTRAVENOUS at 01:21

## 2021-01-16 RX ADMIN — ACETAMINOPHEN 650 MG: 325 TABLET ORAL at 09:40

## 2021-01-16 ASSESSMENT — PAIN SCALES - GENERAL
PAINLEVEL_OUTOF10: 0
PAINLEVEL_OUTOF10: 7
PAINLEVEL_OUTOF10: 4
PAINLEVEL_OUTOF10: 4

## 2021-01-16 ASSESSMENT — PAIN DESCRIPTION - PAIN TYPE
TYPE: ACUTE PAIN

## 2021-01-16 ASSESSMENT — PAIN DESCRIPTION - ORIENTATION
ORIENTATION: MID
ORIENTATION: MID

## 2021-01-16 ASSESSMENT — PAIN DESCRIPTION - DESCRIPTORS: DESCRIPTORS: OTHER (COMMENT)

## 2021-01-16 ASSESSMENT — PAIN DESCRIPTION - PROGRESSION
CLINICAL_PROGRESSION: NOT CHANGED
CLINICAL_PROGRESSION: NOT CHANGED

## 2021-01-16 ASSESSMENT — PAIN DESCRIPTION - ONSET: ONSET: ON-GOING

## 2021-01-16 ASSESSMENT — PAIN DESCRIPTION - LOCATION: LOCATION: ABDOMEN

## 2021-01-16 NOTE — ED PROVIDER NOTES
murmurs, gallops, or rubs. 2+ distal pulses  Chest: no chest wall tenderness  Abdomen: Soft. Tender mid abdomen. Non distended. +BS. No rebound, guarding, or rigidity. No pulsatile masses appreciated. Musculoskeletal: Moves all extremities x 4. Warm and well perfused, no clubbing, cyanosis, or edema. Capillary refill <3 seconds  Skin: Cool. No rashes. Neurologic: GCS 15, CN 2-12 grossly intact, no focal deficits, symmetric strength 5/5 in the upper and lower extremities bilaterally  Psych: Normal Affect    -------------------------------------------------- RESULTS -------------------------------------------------  I have personally reviewed all laboratory and imaging results for this patient. Results are listed below.      LABS:  Results for orders placed or performed during the hospital encounter of 01/15/21   CBC auto differential   Result Value Ref Range    WBC 10.5 4.5 - 11.5 E9/L    RBC 4.64 3.50 - 5.50 E12/L    Hemoglobin 14.3 11.5 - 15.5 g/dL    Hematocrit 45.6 34.0 - 48.0 %    MCV 98.3 80.0 - 99.9 fL    MCH 30.8 26.0 - 35.0 pg    MCHC 31.4 (L) 32.0 - 34.5 %    RDW 14.2 11.5 - 15.0 fL    Platelets 535 824 - 774 E9/L    MPV 10.5 7.0 - 12.0 fL    Neutrophils % 88.7 (H) 43.0 - 80.0 %    Lymphocytes % 7.0 (L) 20.0 - 42.0 %    Monocytes % 3.5 2.0 - 12.0 %    Eosinophils % 0.1 0.0 - 6.0 %    Basophils % 0.1 0.0 - 2.0 %    Neutrophils Absolute 9.35 (H) 1.80 - 7.30 E9/L    Lymphocytes Absolute 0.84 (L) 1.50 - 4.00 E9/L    Monocytes Absolute 0.42 0.10 - 0.95 E9/L    Eosinophils Absolute 0.00 (L) 0.05 - 0.50 E9/L    Basophils Absolute 0.00 0.00 - 0.20 E9/L    Atypical Lymphocytes Relative 0.9 0.0 - 4.0 %    RBC Morphology Normal    Comprehensive Metabolic Panel   Result Value Ref Range    Sodium 142 132 - 146 mmol/L    Potassium 4.2 3.5 - 5.0 mmol/L    Chloride 103 98 - 107 mmol/L    CO2 26 22 - 29 mmol/L    Anion Gap 13 7 - 16 mmol/L    Glucose 135 (H) 74 - 99 mg/dL    BUN 14 8 - 23 mg/dL    CREATININE 1.0 0.5 - underdistention. Other etiologies including peptic ulcer disease or other inflammatory process   not excluded. XR CHEST PORTABLE   Final Result   No acute process. EKG:  This EKG is signed and interpreted by me. Rate: 83  Rhythm: Sinus  Interpretation: non-specific EKG  Comparison: stable as compared to patient's most recent EKG          ------------------------- NURSING NOTES AND VITALS REVIEWED ---------------------------   The nursing notes within the ED encounter and vital signs as below have been reviewed by myself. /71   Pulse 92   Temp 97.6 °F (36.4 °C) (Infrared)   Resp 20   Ht 5' 1\" (1.549 m)   Wt 162 lb (73.5 kg)   SpO2 97%   BMI 30.61 kg/m²   Oxygen Saturation Interpretation: Normal    The patients available past medical records and past encounters were reviewed. ------------------------------ ED COURSE/MEDICAL DECISION MAKING----------------------  Medications   0.9 % sodium chloride bolus (0 mLs Intravenous Stopped 1/16/21 0111)   iopamidol (ISOVUE-370) 76 % injection 90 mL (90 mLs Intravenous Given 1/16/21 0121)   ondansetron (ZOFRAN) injection 4 mg (4 mg Intravenous Given 1/16/21 0421)   dicyclomine (BENTYL) capsule 10 mg (10 mg Oral Given 1/16/21 0428)   0.9 % sodium chloride bolus (0 mLs Intravenous Stopped 1/16/21 0531)   famotidine (PEPCID) injection 20 mg (20 mg Intravenous Given 1/16/21 0423)             Medical Decision Making:    Presenting here because of acute onset of vomiting and diarrhea. Patient arrived here and awake alert but appears ill having diffuse abdominal pain. Patient labs and EKG noted as well as CT noted. Patient medicated here in the emergency department. Patient awake and oriented but appears to be weak we attempted to actually walk the patient to the bathroom here and patient unable to walk steady and almost fell to ground. Patient will be admitted. Re-Evaluations:             Re-evaluation.   Patients symptoms show no change  Patient rechecked several times in the emergency department. Patient medicated for her abdominal pain. Patient still reporting abdominal pain. Patient attempted to ambulate to the bathroom here and very weak unable to walk steady. Patient unsafe to go home. Patient made aware of findings and plan to admit. Consultations:               I did speak to Dr. Ryne Alexis and patient will be admitted  Critical Care: This patient's ED course included: a personal history and physicial eaxmination    This patient has been closely monitored during their ED course. Counseling: The emergency provider has spoken with the patient and discussed todays results, in addition to providing specific details for the plan of care and counseling regarding the diagnosis and prognosis. Questions are answered at this time and they are agreeable with the plan.       --------------------------------- IMPRESSION AND DISPOSITION ---------------------------------    IMPRESSION  1. Nausea vomiting and diarrhea    2. General weakness        DISPOSITION  Disposition: Admit to telemetry  Patient condition is stable        NOTE: This report was transcribed using voice recognition software.  Every effort was made to ensure accuracy; however, inadvertent computerized transcription errors may be present          Carlito Rodriges MD  01/16/21 9431

## 2021-01-16 NOTE — H&P
reports that she has never smoked. She has never used smokeless tobacco.  ETOH:   reports no history of alcohol use. OCCUPATION:   Family History:   History reviewed. No pertinent family history. REVIEW OF SYSTEMS:    · Constitutional: No fever, no chills, no change in weight; no appetite  · HEENT: No blurred vision, no ear problems, no sore throat, no rhinorrhea. · Respiratory: No cough, no sputum production, no pleuritic chest pain, no shortness of breath  · Cardiology: No angina, no dyspnea on exertion, no paroxysmal nocturnal dyspnea, no orthopnea, no palpitation, no leg swelling. · Gastroenterology: No dysphagia, no reflux; diffuse abdominal pain, with nausea and vomiting;  diarrhea.  No hematochezia   · Genitourinary: No dysuria, no frequency, hesitancy; no hematuria  · Musculoskeletal: weakness in bilateral legs  · Neurology: no focal weakness in extremities, no slurred speech, no double vision, no tingling or numbness sensation  · Endocrinology: no temperature intolerance, no polyphagia, polydipsia or polyuria  · Hematology: no increased bleeding, no bruising, no lymphadenopathy  · Skin: no skin changes noticed by patient  · Psychology: no depressed mood, no suicidal ideation    Physical Exam   · Vitals: /76   Pulse 82   Temp 97.6 °F (36.4 °C)   Resp 18   Ht 5' 1\" (1.549 m)   Wt 162 lb (73.5 kg)   SpO2 96%   BMI 30.61 kg/m²     · General Appearance: alert and oriented to person, place and time, well developed and well- nourished, in no acute distress  · Skin: warm and dry, no rash or erythema  · Head: normocephalic and atraumatic  · Neck: supple and non-tender without mass, no thyromegaly or thyroid nodules, no cervical lymphadenopathy  · Pulmonary/Chest: clear to auscultation bilaterally- no wheezes, rales or rhonchi, normal air movement, no respiratory distress  · Cardiovascular: normal rate, regular rhythm, normal S1 and S2, no murmurs, rubs, clicks, or gallops, distal pulses intact, no carotid bruits  · Abdomen: soft, diffusely-tender, non-distended, normal bowel sounds, no masses or organomegaly  · Extremities: no cyanosis, clubbing or edema  · Musculoskeletal: weakness reported  · Neurologic: coordination and speech normal   Labs and Imaging Studies   Basic Labs  Recent Labs     01/15/21  2355      K 4.2      CO2 26   BUN 14   CREATININE 1.0   GLUCOSE 135*   CALCIUM 9.1       Recent Labs     01/15/21  2355   WBC 10.5   RBC 4.64   HGB 14.3   HCT 45.6   MCV 98.3   MCH 30.8   MCHC 31.4*   RDW 14.2      MPV 10.5       CBC:   Lab Results   Component Value Date    WBC 10.5 01/15/2021    RBC 4.64 01/15/2021    HGB 14.3 01/15/2021    HCT 45.6 01/15/2021    MCV 98.3 01/15/2021    RDW 14.2 01/15/2021     01/15/2021     BMP:    Lab Results   Component Value Date     01/15/2021    K 4.2 01/15/2021    K 3.7 11/25/2019     01/15/2021    CO2 26 01/15/2021    BUN 14 01/15/2021       Imaging Studies:     Ct Abdomen Pelvis W Iv Contrast Additional Contrast? None    Result Date: 1/16/2021  EXAMINATION: CT OF THE ABDOMEN AND PELVIS WITH CONTRAST 1/16/2021 1:19 am TECHNIQUE: CT of the abdomen and pelvis was performed with the administration of intravenous contrast. Multiplanar reformatted images are provided for review. Dose modulation, iterative reconstruction, and/or weight based adjustment of the mA/kV was utilized to reduce the radiation dose to as low as reasonably achievable. COMPARISON: None. HISTORY: ORDERING SYSTEM PROVIDED HISTORY: abdominal pain TECHNOLOGIST PROVIDED HISTORY: Reason for exam:->abdominal pain Additional Contrast?->None What reading provider will be dictating this exam?->CRC FINDINGS: Lower Chest: Scattered areas of scarring and/or atelectasis in the lung bases. Organs: Liver is homogeneous. Gallbladder is largely decompressed without evidence of acute inflammatory change. Spleen is normal in size. Pancreas is unremarkable. No adrenal mass. Probable small bilateral parapelvic renal cysts. GI/Bowel: Somewhat limited assessment without oral contrast.  Mild fluid distention of the distal esophagus suggestive of reflux. Small hiatal hernia. Mild thickening of the gastric antrum. Mild fluid distension and thickening of multiple small bowel loops without definite evidence of obstruction. The appendix is not identified with certainty but there is no obvious localized pericecal inflammatory change. Pelvis: Urinary bladder is unremarkable. Uterus is absent. Small fat containing bilateral inguinal hernias. Calcifications in the pelvis are most consistent with phleboliths. Peritoneum/Retroperitoneum: No free fluid, free air or localized fluid collection. Bones/Soft Tissues: Prominent degenerative changes throughout the spine and scattered in the pelvis. Mild thickening and fluid distention of small bowel, consider enteritis. Findings suggest mild gastroesophageal reflux. Slight thickening of the gastric antrum may be related to underdistention. Other etiologies including peptic ulcer disease or other inflammatory process not excluded. Xr Chest Portable    Result Date: 1/15/2021  EXAMINATION: ONE XRAY VIEW OF THE CHEST 1/15/2021 10:27 pm COMPARISON: /24/19 HISTORY: ORDERING SYSTEM PROVIDED HISTORY: cough vomiting TECHNOLOGIST PROVIDED HISTORY: Reason for exam:->cough vomiting What reading provider will be dictating this exam?->CRC FINDINGS: The lungs are without acute focal process. There is no effusion or pneumothorax. The cardiomediastinal silhouette is without acute process. The osseous structures are without acute process. No acute process. EKG: normal sinus rhythm, unchanged from previous tracings.     Resident's Assessment and Plan     Assessment  Acute gastroenteritis 2/2 food/inflammatory/infectious  Hypertension  Hyperlipidemia  Osteoarthritis    Plan  Continue IV fluids, on normal saline  No guarding or rigidity on exam  Advance diet as tolerated  Monitor vitals  Follow pancultures and stool studies  No antibiotics for now   CT abdomen showed Mild thickening and fluid distention of small bowel, consider enteritis. Will follow C. difficile/stool culture  Hold on blood pressure meds  Continue PPI  Follow lipid panel      PT/OT evaluation: Ordered  DVT prophylaxis/ GI prophylaxis: Lovenox\Pepcid  Disposition: Inpatient    Misty Granado M.D.   Internal Medicine Resident - PGY 2    Attending physician: Dr. Katherine Esteves

## 2021-01-16 NOTE — PROGRESS NOTES
[address]   Internal Medicine Residency / [address]    Attending Physician Statement  I have discussed the case, including pertinent history and exam findings with the resident and the team.  I have seen and examined the patient and the key elements of the encounter have been performed by me. I agree with the assessment, plan and orders as documented by the resident. Admitted with gastroenteritis complaints and hypotension  Still very weak and VS stable  Unable to ambulate  Not taking clear liquids at this time  Plan; Continue IV's and follow     Remainder of medical problems as per resident note.       Joseline Jerez MD FRCP([facility])  Internal Medicine Residency Faculty

## 2021-01-16 NOTE — ED NOTES
Attempted to ambulate patient. Patient unable to ambulate due to weakness. Dr. Diego Patiño aware.       John Rowan, SHRUTHI  01/16/21 4140

## 2021-01-17 VITALS
WEIGHT: 157.44 LBS | RESPIRATION RATE: 18 BRPM | DIASTOLIC BLOOD PRESSURE: 70 MMHG | HEIGHT: 61 IN | TEMPERATURE: 97.8 F | BODY MASS INDEX: 29.72 KG/M2 | HEART RATE: 63 BPM | OXYGEN SATURATION: 97 % | SYSTOLIC BLOOD PRESSURE: 114 MMHG

## 2021-01-17 LAB
ALBUMIN SERPL-MCNC: 3.1 G/DL (ref 3.5–5.2)
ALP BLD-CCNC: 65 U/L (ref 35–104)
ALT SERPL-CCNC: 8 U/L (ref 0–32)
ANION GAP SERPL CALCULATED.3IONS-SCNC: 7 MMOL/L (ref 7–16)
AST SERPL-CCNC: 13 U/L (ref 0–31)
BASOPHILS ABSOLUTE: 0 E9/L (ref 0–0.2)
BASOPHILS RELATIVE PERCENT: 0 % (ref 0–2)
BILIRUB SERPL-MCNC: <0.2 MG/DL (ref 0–1.2)
BUN BLDV-MCNC: 8 MG/DL (ref 8–23)
CALCIUM SERPL-MCNC: 7.8 MG/DL (ref 8.6–10.2)
CHLORIDE BLD-SCNC: 109 MMOL/L (ref 98–107)
CO2: 26 MMOL/L (ref 22–29)
CREAT SERPL-MCNC: 0.9 MG/DL (ref 0.5–1)
EOSINOPHILS ABSOLUTE: 0.01 E9/L (ref 0.05–0.5)
EOSINOPHILS RELATIVE PERCENT: 0.4 % (ref 0–6)
GFR AFRICAN AMERICAN: >60
GFR NON-AFRICAN AMERICAN: >60 ML/MIN/1.73
GLUCOSE BLD-MCNC: 78 MG/DL (ref 74–99)
HCT VFR BLD CALC: 40.8 % (ref 34–48)
HEMOGLOBIN: 12.7 G/DL (ref 11.5–15.5)
IMMATURE GRANULOCYTES #: 0.04 E9/L
IMMATURE GRANULOCYTES %: 1.6 % (ref 0–5)
LYMPHOCYTES ABSOLUTE: 0.75 E9/L (ref 1.5–4)
LYMPHOCYTES RELATIVE PERCENT: 29.5 % (ref 20–42)
MCH RBC QN AUTO: 30.8 PG (ref 26–35)
MCHC RBC AUTO-ENTMCNC: 31.1 % (ref 32–34.5)
MCV RBC AUTO: 99 FL (ref 80–99.9)
MONOCYTES ABSOLUTE: 0.21 E9/L (ref 0.1–0.95)
MONOCYTES RELATIVE PERCENT: 8.3 % (ref 2–12)
NEUTROPHILS ABSOLUTE: 1.53 E9/L (ref 1.8–7.3)
NEUTROPHILS RELATIVE PERCENT: 60.2 % (ref 43–80)
PDW BLD-RTO: 14.3 FL (ref 11.5–15)
PLATELET # BLD: 151 E9/L (ref 130–450)
PMV BLD AUTO: 10.7 FL (ref 7–12)
POTASSIUM REFLEX MAGNESIUM: 3.8 MMOL/L (ref 3.5–5)
RBC # BLD: 4.12 E12/L (ref 3.5–5.5)
REASON FOR REJECTION: NORMAL
REJECTED TEST: NORMAL
SODIUM BLD-SCNC: 142 MMOL/L (ref 132–146)
TOTAL PROTEIN: 5.8 G/DL (ref 6.4–8.3)
URINE CULTURE, ROUTINE: NORMAL
WBC # BLD: 2.5 E9/L (ref 4.5–11.5)

## 2021-01-17 PROCEDURE — 97165 OT EVAL LOW COMPLEX 30 MIN: CPT

## 2021-01-17 PROCEDURE — 2500000003 HC RX 250 WO HCPCS: Performed by: INTERNAL MEDICINE

## 2021-01-17 PROCEDURE — 6370000000 HC RX 637 (ALT 250 FOR IP): Performed by: INTERNAL MEDICINE

## 2021-01-17 PROCEDURE — 80053 COMPREHEN METABOLIC PANEL: CPT

## 2021-01-17 PROCEDURE — 99231 SBSQ HOSP IP/OBS SF/LOW 25: CPT | Performed by: INTERNAL MEDICINE

## 2021-01-17 PROCEDURE — 97530 THERAPEUTIC ACTIVITIES: CPT

## 2021-01-17 PROCEDURE — 2060000000 HC ICU INTERMEDIATE R&B

## 2021-01-17 PROCEDURE — 36415 COLL VENOUS BLD VENIPUNCTURE: CPT

## 2021-01-17 PROCEDURE — 85025 COMPLETE CBC W/AUTO DIFF WBC: CPT

## 2021-01-17 PROCEDURE — 6360000002 HC RX W HCPCS: Performed by: INTERNAL MEDICINE

## 2021-01-17 PROCEDURE — 97161 PT EVAL LOW COMPLEX 20 MIN: CPT

## 2021-01-17 RX ORDER — ATORVASTATIN CALCIUM 40 MG/1
40 TABLET, FILM COATED ORAL NIGHTLY
Qty: 30 TABLET | Refills: 3 | Status: SHIPPED | OUTPATIENT
Start: 2021-01-17 | End: 2021-05-10 | Stop reason: SDUPTHER

## 2021-01-17 RX ADMIN — ATORVASTATIN CALCIUM 40 MG: 80 TABLET, FILM COATED ORAL at 22:58

## 2021-01-17 RX ADMIN — ENOXAPARIN SODIUM 40 MG: 40 INJECTION SUBCUTANEOUS at 07:41

## 2021-01-17 RX ADMIN — ACETAMINOPHEN 650 MG: 325 TABLET ORAL at 08:01

## 2021-01-17 RX ADMIN — FAMOTIDINE 20 MG: 10 INJECTION INTRAVENOUS at 07:40

## 2021-01-17 ASSESSMENT — PAIN SCALES - GENERAL: PAINLEVEL_OUTOF10: 3

## 2021-01-17 ASSESSMENT — PAIN DESCRIPTION - LOCATION: LOCATION: NECK

## 2021-01-17 NOTE — PROGRESS NOTES
Alize Sweeney,    Your patient is on a medication that requires a renal dose adjustment. Renal Function Assessment:    Date Body Weight SCr CrCl Dialysis status   1/17/2021 71.4 kg 0.9 47 ml/min Not on       Pharmacy has renally dose-adjusted the following medication(s):    Date Medication Original Dosing Regimen New Dosing Regimen   1/17/2021 famotidine 20 mg IV q12h 10 mg IV q12h           These changes were made per protocol according to the [facility] Renal Function-Based Dose Adjustments Policy    *Please note this dose may need readjusted if your patient's renal function significantly improves. Please contact pharmacy with any questions regarding these changes.     Timmy Lugo, PharmD 1/17/2021 3:16 PM

## 2021-01-17 NOTE — PROGRESS NOTES
Physical Therapy  Physical Therapy Initial Assessment     Name: Alice German  :   MRN: [id number]    Referring Provider:  Erwin Ibarra MD    Date of Service: 2021    Evaluating PT:  Claudell Ferry, PT, DPT. [id number]    Room #:  8425/4693-T  Diagnosis:  Weakness   Reason for admission:  Nausea, vomiting, weakness   Precautions:  Falls, contact iso   Procedures: none  Equipment Recommendations:  none    SUBJECTIVE:  Pt lives with daughter and son in a 1 story home with 0 stair(s) to enter and 0 rail(s). Bed is on 1st floor and bath is on 1st floor. Pt ambulated with no AD PTA. OBJECTIVE:   Initial Evaluation  Date:  Treatment   Short Term/ Long Term   Goals   AM-PAC 6 Clicks      Was pt agreeable to Eval/treatment? Yes      Does pt have pain? Denies pain     Bed Mobility  Rolling: NT  Supine to sit: SBA  Sit to supine: SBA  Scooting: SBA  Independent    Transfers Sit to stand: SBA  Stand to sit: SBA  Stand pivot: NT  Independent    Ambulation    50 feet with no AD SBA    >150 feet with no AD independent   Stair negotiation: ascended and descended  NT  >1 steps with 1 rail mod I    ROM BUE:  See OT eval   BLE:  WFL     Strength BUE:  See OT eval   BLE:  knee ext 5/5  Ankle DF 5/5  Increase by 1/3 MMT grade    Balance Sitting EOB:  Independent  Dynamic Standing:  SBA  Sitting EOB:  Independent  Dynamic Standing:  Independent     -Pt is A & O x 4  -Sensation:  unremarkable   -Edema:  unremarkable     Therapeutic Exercises:  functional activity     Patient education  Pt educated on safety, sequencing of transfers, and role of PT    Patient response to education:   Pt verbalized understanding Pt demonstrated skill Pt requires further education in this area   Yes  Yes  Yes      ASSESSMENT:    Comments:  Pt received supine in bed and agreeable to PT session   Pt able to complete all transfers and mobility without hands on assistance. Ambulating in room without device.  Gait speed slow and hesitant but had no observable difficulty or unsteadiness. Returned to bed to end session. Pt with all needs met and call light in reach. Pt would benefit from continued PT POC to address functional deficits described above. Treatment:  Patient practiced and was instructed in the following treatment:     Patient education provided continuously throughout session for sequencing, safety maintenance, and improving any deficits found during the evaluation. · Gait training- pt was given verbal and tactile cues to facilitate normal speed and pattern during ambulation     Pt's/ family goals   1. Return home     Patient and or family understand(s) diagnosis, prognosis, and plan of care. Yes     PLAN:    Current Treatment Recommendations   [] Strengthening     [] ROM   [x] Balance Training   [x] Endurance Training   [x] Transfer Training   [x] Gait Training   [x] Stair Training   [] Positioning   [] Safety and Education Training   [] Patient/Caregiver Education   [] HEP  [] Other     Frequency of treatments: 2-5x/week x 1-2 weeks. Time in  0910  Time out  0920    Total Treatment Time - minutes     Evaluation Time includes thorough review of current medical information, gathering information on past medical history/social history and prior level of function, completion of standardized testing/informal observation of tasks, assessment of data and education on plan of care and goals.     CPT codes:  [x] Low Complexity PT evaluation 20978  [] Moderate Complexity PT evaluation 13677  [] High Complexity PT evaluation 35571  [] PT Re-evaluation 86148  [] Gait training 20349 - minutes  [] Manual therapy 25202 - minutes  [] Therapeutic activities 69532 - minutes  [] Therapeutic exercises 33039 - minutes  [] Neuromuscular reeducation 59269 - minutes     Lore Deras, PT, DPT  [id number]

## 2021-01-17 NOTE — PROGRESS NOTES
[address]   Internal Medicine Residency / [address]    Attending Physician Statement  I have discussed the case, including pertinent history and exam findings with the resident and the team.  I have seen and examined the patient and the key elements of the encounter have been performed by me. I agree with the assessment, plan and orders as documented by the resident. A&O  Tolerating clear liquids and desiring solids  Studies negative  R/O Tainted food acquired from dispensing machine and   She will report to [facility]   If tolerating solids later considering discharge   Remainder of medical problems as per resident note.       Lore Dunlap MD FRCP([facility])  Internal Medicine Residency Faculty

## 2021-01-17 NOTE — DISCHARGE SUMMARY
did not resume as her bp was controlled. PCP might want to address this at follow up visit. She was complaining of leg weakness. Vit b12 and Vit D ordered as outpatient. We are discharging her home in stable condition.      Significant findings (history and exam, laboratory, radiological, pathology, other tests):   · General Appearance: alert, appears stated age and cooperative  · HEENT:  Head: Normocephalic, no lesions, without obvious abnormality. · Neck: no adenopathy, no carotid bruit, no JVD, supple, symmetrical, trachea midline and thyroid not enlarged, symmetric, no tenderness/mass/nodules  · Lung: clear to auscultation bilaterally  · Heart: regular rate and rhythm, S1, S2 normal, no murmur, click, rub or gallop  · Abdomen: soft, non-tender; bowel sounds normal; no masses,  no organomegaly  · Extremities:  extremities normal, atraumatic, no cyanosis or edema  · Musculokeletal: No joint swelling, no muscle tenderness. ROM normal in all joints of extremities. · Neurologic: Mental status: Alert, oriented, thought content appropriate      Pending test results: vit b12, vit D     Consults:  1. PT/OT    Procedures:  1. None     Condition at discharge: Stable    Disposition: home    Discharge Medications:  Current Discharge Medication List      CONTINUE these medications which have CHANGED    Details   atorvastatin (LIPITOR) 40 MG tablet Take 1 tablet by mouth nightly  Qty: 30 tablet, Refills: 3    Associated Diagnoses: Hyperlipidemia, unspecified hyperlipidemia type         STOP taking these medications       pyridoxine (B-6) 50 MG tablet Comments:   Reason for Stopping:         lisinopril (PRINIVIL;ZESTRIL) 10 MG tablet Comments:   Reason for Stopping:         amLODIPine (NORVASC) 2.5 MG tablet Comments:   Reason for Stopping:         Misc. Devices MISC Comments:   Reason for Stopping:               Activity: activity as tolerated  Diet: regular diet    Follow-up appointments:    Follow-up With  Details  Why

## 2021-01-17 NOTE — PROGRESS NOTES
Occupational Therapy  OCCUPATIONAL THERAPY INITIAL EVALUATION      Date:2021  Patient Name: Bright Woodall  MRN: [id number]  : 1950  Room: [address] [place] [address]    Evaluating OT: Fiorella Lopez OTR/L #[id number]    Referring physician: Gennette Najjar, MD  AM-PAC Daily Activity Raw Score: 24  Recommended Adaptive Equipment: TBD     Reason for Admission/Diagnosis: Weakness, Gastroenteritis      Pertinent Medical History/Surgery: HTN, HLD, lymphedema, isolated proteinuria with morphologic lesion, AMS, panic attacks       Precautions:  Falls, contact     Home Living: Pt lives with daughter, son and grandson in a one story house with no steps to enter. Bedroom and bathroom are located on the first floor. Laundry is located first floor. Bathroom setup: Tub/shower, standard commode   Equipment owned: none  Prior Level of Function:   I/mod I with ADLs with patient reporting she completes sponge baths only ,  I with IADLs. Patient was using no device for functional mobility.    Driving: yes                             Occupation: Works as [employer]       Pain Level: Abdominal pain, patient did not rate intesity  Cognition: A&O: self:yes, place:yes, time: yes, situation:yes  Follows 2-3 step directions   Memory:  good    Sequencing:  good    Problem solving:  fair    Judgement/safety:  fair      Functional Assessment:   Initial Eval Status  Date: 21 Treatment Status  Date: Short Term Goals=LTG  Treatment frequency: PRN 1-3 x/week   Feeding Modified [place]       Grooming Stand by Assist   Completed grooming task while standing at the sink  Modified [place]     UB Dressing Stand by Assist   Modified [place]    LB Dressing Stand by Assist   Donned/doffed socks  Modified [place]    Bathing Minimal Assist   Modified [place]    Toileting Stand by Assist   Completed toileting using standard commode with SBA for todd-hygiene while seated  Modified [place]    Bed Mobility  Supine to sit: SBA   Sit to supine: SBA  Supine to sit: Independent   Sit to supine: Independent   Functional Transfers Sit to stand: SBA  Stand to sit: SBA  Toilet Transfer: SBA  Independent   Functional Mobility CGA  Completed functional mobility in room/bathroom without device  Independent  Functional household distance   Balance Sitting:     Static:SBA    Dynamic:SBA  Standing: SBA     Activity Tolerance Fair+  Good   Visual/  Perceptual Glasses: yes         Safety       Fair                  Good     Upper Extremities:    Treatment Status  Date: Short Term Goals=LTG   B UE UE ROM:   RUE:  WFL  LUE:  WFL    Strength: RUE:  4-/5 LUE:  4-/5     Strength: B: 4-/5                     Coordination Fine Motor Coordination:  WFL    Gross Motor Coordination:   WFL                     Hand dominance: R handed    Hearing: WFL  Sensation:  c/o numbness or tingling in B hands  Tone:  WFL  Edema: none observed B UE                            Comments: Nursing approved OT session. Upon arrival, patient semi-supine in bed and agreeable to session. OT evaluation performed with education provided regarding the purpose and benefits of OT session, along with mobility and I/ADL completion. Patient demonstrates decreased safety awareness with ADLs/ functional mobility. At end of session, patient semi-supine in bed with call light and phone within reach, along with all lines and tubes intact. Treatment: OT treatment provided this date includes:    ADL training-  Instruction/training on safety and adapted techniques for completion of ADLs: Patient completed toileting using standard commode with SBA overall for todd-hygiene while seated. SBA to complete grooming task to wash hands while standing at sink. Verbal cues for safety with lines.   Mobility training-  Instruction/training on safety and improved independence with bed mobility SBA supine<>sit. SBA sit<>stand and to complete toilet transfer.  Patient completed functional mobility in room with CGA overall for safety and balance. Patient attempting to furniture walk in room with verbal cues for safety and awareness of lines for safety.   Skilled monitoring of vitals-  Skilled monitoring of the patient's response throughout treatment. Patient would  benefit from continued skilled OT  to maximize functional outcomes as they relate to I/ADLs and functional mobility.      Eval Complexity: Low    Assessment of current deficits   Functional mobility [x]  ADLs [x] Strength [x]  Cognition []  Functional transfers  [x] IADLs [x] Safety Awareness [x]  Endurance/Activity tolerance [x]  Fine Motor Coordination [] Balance [x] Vision/perception [] Sensation []   Gross Motor Coordination [] ROM [] Delirium []                  Motor Control []    Plan of Care:  OT 1-3x/week for 1-2 weeks PRN   [x] ADL retraining/modified techniques, along with assistive device recommendations to maximize patient safety and performance with self-care while maintaining precautions   [x]Balance retraining/tolerance tasks for facilitation of postural control with dynamic challenges during ADLs and functional activities   [x] Delirium Prevention/treatment to maximize functional outcomes   [] Cognitive Re-Training/ executive function skills for safe participation in ADLs/IADLs, along with functional activities   [x] Energy conservation techniques/Strategies to improve activity tolerance      [x] Environmental modifications for safe mobility and completion of ADLs    [x] Functional mobility training/DME recommendations for increased performance, safety and fall prevention  while maintaining precautions     [x] Functional transfer/mobility training/DME recommendations for increased performance, safety and fall prevention while maintaining precautions           []Neuromuscular re-education: facilitation of righting/equilibrium reactions, midline orientation, scapular stability/mobility, normalization muscle tone and

## 2021-01-18 ENCOUNTER — TELEPHONE (OUTPATIENT)
Dept: INTERNAL MEDICINE | Age: 71
End: 2021-01-18

## 2021-01-18 NOTE — PROGRESS NOTES
RN telephoned son for patient d/t patient phone being dead. Son didn't  RN left message for patient with call back number.

## 2021-01-21 LAB
BLOOD CULTURE, ROUTINE: NORMAL
CULTURE, BLOOD 2: NORMAL

## 2021-01-28 ENCOUNTER — OFFICE VISIT (OUTPATIENT)
Dept: INTERNAL MEDICINE | Age: 71
End: 2021-01-28
Payer: COMMERCIAL

## 2021-01-28 VITALS
SYSTOLIC BLOOD PRESSURE: 124 MMHG | DIASTOLIC BLOOD PRESSURE: 76 MMHG | WEIGHT: 156 LBS | HEART RATE: 61 BPM | BODY MASS INDEX: 30.63 KG/M2 | HEIGHT: 60 IN | RESPIRATION RATE: 16 BRPM | TEMPERATURE: 97.7 F

## 2021-01-28 DIAGNOSIS — D89.89 OTHER SPECIFIED DISORDERS INVOLVING THE IMMUNE MECHANISM, NOT ELSEWHERE CLASSIFIED (HCC): ICD-10-CM

## 2021-01-28 DIAGNOSIS — E88.09 HYPOALBUMINEMIA: ICD-10-CM

## 2021-01-28 DIAGNOSIS — D70.8 OTHER NEUTROPENIA (HCC): ICD-10-CM

## 2021-01-28 DIAGNOSIS — D72.819 LEUKOPENIA, UNSPECIFIED TYPE: ICD-10-CM

## 2021-01-28 DIAGNOSIS — R41.82 ALTERED MENTAL STATUS, UNSPECIFIED ALTERED MENTAL STATUS TYPE: ICD-10-CM

## 2021-01-28 DIAGNOSIS — Z12.31 ENCOUNTER FOR SCREENING MAMMOGRAM FOR MALIGNANT NEOPLASM OF BREAST: ICD-10-CM

## 2021-01-28 DIAGNOSIS — K52.9 GASTROENTERITIS: Primary | ICD-10-CM

## 2021-01-28 DIAGNOSIS — E83.51 HYPOCALCEMIA: ICD-10-CM

## 2021-01-28 DIAGNOSIS — Z00.00 HEALTHCARE MAINTENANCE: ICD-10-CM

## 2021-01-28 PROCEDURE — 99495 TRANSJ CARE MGMT MOD F2F 14D: CPT | Performed by: INTERNAL MEDICINE

## 2021-01-28 PROCEDURE — 99212 OFFICE O/P EST SF 10 MIN: CPT | Performed by: INTERNAL MEDICINE

## 2021-01-28 PROCEDURE — 1111F DSCHRG MED/CURRENT MED MERGE: CPT | Performed by: INTERNAL MEDICINE

## 2021-01-28 SDOH — ECONOMIC STABILITY: TRANSPORTATION INSECURITY
IN THE PAST 12 MONTHS, HAS THE LACK OF TRANSPORTATION KEPT YOU FROM MEDICAL APPOINTMENTS OR FROM GETTING MEDICATIONS?: NO

## 2021-01-28 SDOH — ECONOMIC STABILITY: INCOME INSECURITY: HOW HARD IS IT FOR YOU TO PAY FOR THE VERY BASICS LIKE FOOD, HOUSING, MEDICAL CARE, AND HEATING?: SOMEWHAT HARD

## 2021-01-28 ASSESSMENT — PATIENT HEALTH QUESTIONNAIRE - PHQ9
2. FEELING DOWN, DEPRESSED OR HOPELESS: 0
SUM OF ALL RESPONSES TO PHQ9 QUESTIONS 1 & 2: 0
SUM OF ALL RESPONSES TO PHQ QUESTIONS 1-9: 0

## 2021-01-28 NOTE — PROGRESS NOTES
[facility]    Attending Physician Statement  I have discussed the case, including pertinent history and exam findings with the resident physician. I have seen and examined the patient and the key elements of the encounter have been performed by me. I agree with the assessment, plan and orders as documented by the resident. I have reviewed all pertinent PMHx, PSHx, FamHx, SocialHx, medications, and allergies and updated history as appropriate. Patient presents for hospital follow up appointment. Patient admitted with dehydration likely due to gastroenteritis. She also has hypoalbuminemia and low protein. Patient states she lives with her daughter and son-in-law, single story dwelling, has her own bedroom. Does not require any assistive device for walking. She states that she heats well and has a good appetite and has not lost any weight. Patient has refused colonoscopy. Would consider mini-mental status exam at some point. Had leukopenia on CBC, needs repeated, agree with checking B 12 level. Remainder of medical problems as per resident note.     Sandra Ng DO  1/28/2021 2:13 PM

## 2021-01-28 NOTE — PROGRESS NOTES
[facility]  Clinic note           History of presenting Illness      Patient ID:  Ramu Dumas is a 69 y.o. female with PMH of  *** who presents to the clinic for ***    HPI:       Medical History      Past Medical History:      Diagnosis Date    Hyperlipidemia     Hypertension        Past Surgical History:        Procedure Laterality Date    APPENDECTOMY       SECTION      HYSTERECTOMY         Medications Prior to Admission:    Prior to Admission medications    Medication Sig Start Date End Date Taking? Authorizing Provider   atorvastatin (LIPITOR) 40 MG tablet Take 1 tablet by mouth nightly 21   Satish Mccoy MD       Allergies:  Patient has no known allergies. Social History:   TOBACCO:   reports that she has never smoked. She has never used smokeless tobacco.  ETOH:   reports no history of alcohol use. Family History:   No family history on file. Review of Systems     ***  ? Constitutional: (-) fever, (-) chills (-) weight loss. ? Head: (-) headache, (-) light headedness and (-) dizziness. ? Eyes: (-) changes in vision. ? Mouth: (-) difficulty swallowing. ? Neck: (-) stiffness, (-) swelling and (-) pain. ? Respiratory: (-) SOB and (-) cough. ? Cardiovascular: (-) chest pain and (-) palpitations. ? GI:  (-) nausea, (-) vomiting, (-) diarrhea, (-) constipation and (-) abdomen pain. ? Urology: (-) pain with urination, (-) difficulty urinating, (-) urinary incontinence and (-) increased urination. ? Musculoskeletal: (-) muscle weakness (-) new joint pain. ? Hematology: (-) bruising (-) bleeding. ? Neurologic: (-) tingling, (-) numbness (-) focal neurological deficits. Physical Exam   ***  · Vitals: There were no vitals taken for this visit. · Constitutional: Alert, AaO x3. Follows commands. In no apparent distress. · Head: Normocephalic and atraumatic.

## 2021-01-28 NOTE — PATIENT INSTRUCTIONS
· Follow up in 1 month   · Please obtain labs prior to next visit   · Will call to schedule mammogram

## 2021-01-29 NOTE — PROGRESS NOTES
Post-Discharge Transitional Care Management Services or Hospital Follow Up      Idris Hodge   YOB: 1950    Date of Office Visit:  1/28/2021  Date of Hospital Admission: 1/15/21  Date of Hospital Discharge: 1/18/21  Risk of hospital readmission (high >=14%. Medium >=10%) :Readmission Risk Score: 9      Care management risk score Rising risk (score 2-5) and Complex Care (Scores >=6): 0     Non face to face  following discharge, date last encounter closed (first attempt may have been earlier): 1/18/2021 11:12 AM    Call initiated 2 business days of discharge: Yes    Patient Active Problem List   Diagnosis    Venous stasis    Lymphedema    Isolated proteinuria with morphologic lesion    AMS (altered mental status)    Altered mental status    Panic attacks    Weakness    Gastroenteritis    Hypoalbuminemia    Hypocalcemia    Other neutropenia (HCC)       No Known Allergies    Medications listed as ordered at the time of discharge from hospital   [facility]   Home Medication Instructions [id number]    Printed on:01/29/21 1127   Medication Information                      atorvastatin (LIPITOR) 40 MG tablet  Take 1 tablet by mouth nightly                   Medications marked \"taking\" at this time  Outpatient Medications Marked as Taking for the 1/28/21 encounter (Office Visit) with Emely Sweet MD   Medication Sig Dispense Refill    atorvastatin (LIPITOR) 40 MG tablet Take 1 tablet by mouth nightly 30 tablet 3        Medications patient taking as of now reconciled against medications ordered at time of hospital discharge: Yes    Chief Complaint   Patient presents with    Nausea & Vomiting     hfu    Diarrhea     hfu       History of Present illness - Follow up of Hospital diagnosis(es): Viral enteritis    Inpatient course: Discharge summary reviewed- see chart.     Interval history/Current status:Symptoms have resolved A comprehensive review of systems was negative except for what was noted in the HPI. Vitals:    01/28/21 1327   BP: 124/76   Pulse: 61   Resp: 16   Temp: 97.7 °F (36.5 °C)   TempSrc: Temporal   Weight: 156 lb (70.8 kg)   Height: 5' (1.524 m)     Body mass index is 30.47 kg/m². Wt Readings from Last 3 Encounters:   01/28/21 156 lb (70.8 kg)   01/16/21 157 lb 7 oz (71.4 kg)   01/10/20 162 lb 14.4 oz (73.9 kg)     BP Readings from Last 3 Encounters:   01/28/21 124/76   01/17/21 114/70   01/10/20 127/70        Physical Exam:  · Vitals: /76   Pulse 61   Temp 97.7 °F (36.5 °C) (Temporal)   Resp 16   Ht 5' (1.524 m)   Wt 156 lb (70.8 kg)   BMI 30.47 kg/m²       · Constitutional: Alert, AaO x3. Follows commands. In no apparent distress. · Head: Normocephalic and atraumatic. · Mouth: Mucus membranes moist. Oropharynx clear. No deviation of the tongue or uvula. Normal dentition. · Neck: (-)  swelling, (-) JVD   · Respiratory: Lungs clear to auscultation bilaterally. (-)  wheezes, (-)  rales, (-)  rhonchi. · Cardiovascular: RRR. (-)  murmurs, (-) gallops,  (-) rubs. S1 and S2 were normal.   · GI:  Abdomen soft, non-tender, non-distended. (+) BS. (-)  rebound, (-) guarding, (-) rigidity. Seborrheic keratosis on the left portion of the abdomen   · Extremities: Warm and well perfused. (-) clubbing, (-) cyanosis. 2+ distal pulses. (-) peripheral edema. · Neurologic:  Cranial nerves II-XII grossly intact. No other focal neurological deficits. Assessment/Plan:  HTN  ? Recent dihareal ilness with hypotension  ? Blood pressure medicaitions discontinued on dischartge   ? BP today  Normotensive   ?  Will continue to hold BP meds  Diarrheal illness likely viral enteritis  · Follow-up BMP, calcium, magnesium  · Patient will need mini mental on future visits  Hypocalcemia  · Calcium borderline with albumin correction  · Follow-up vitamin D level  Leukopenia  · Follow-up TSH B12  · Follow-up CBC Seborrheic keratosis  · Healthcare maintenance  · Will need colonoscopy, sigmoidoscopy 8 years ago showed fragments of tubular adenoma      Medical Decision Making: straightforward

## 2021-04-26 ENCOUNTER — APPOINTMENT (OUTPATIENT)
Dept: CT IMAGING | Age: 71
DRG: 177 | End: 2021-04-26
Payer: COMMERCIAL

## 2021-04-26 ENCOUNTER — APPOINTMENT (OUTPATIENT)
Dept: GENERAL RADIOLOGY | Age: 71
DRG: 177 | End: 2021-04-26
Payer: COMMERCIAL

## 2021-04-26 ENCOUNTER — HOSPITAL ENCOUNTER (INPATIENT)
Age: 71
LOS: 4 days | Discharge: HOME HEALTH | DRG: 177 | End: 2021-04-30
Attending: EMERGENCY MEDICINE | Admitting: FAMILY MEDICINE
Payer: COMMERCIAL

## 2021-04-26 DIAGNOSIS — I26.99 PULMONARY EMBOLISM ON LEFT (HCC): Primary | ICD-10-CM

## 2021-04-26 DIAGNOSIS — U07.1 COVID-19: ICD-10-CM

## 2021-04-26 DIAGNOSIS — R06.00 DYSPNEA, UNSPECIFIED TYPE: ICD-10-CM

## 2021-04-26 LAB
ALBUMIN SERPL-MCNC: 4.2 G/DL (ref 3.5–5.2)
ALP BLD-CCNC: 84 U/L (ref 35–104)
ALT SERPL-CCNC: 12 U/L (ref 0–32)
ANION GAP SERPL CALCULATED.3IONS-SCNC: 13 MMOL/L (ref 7–16)
AST SERPL-CCNC: 21 U/L (ref 0–31)
BASOPHILS ABSOLUTE: 0 E9/L (ref 0–0.2)
BASOPHILS RELATIVE PERCENT: 0 % (ref 0–2)
BILIRUB SERPL-MCNC: 0.3 MG/DL (ref 0–1.2)
BUN BLDV-MCNC: 19 MG/DL (ref 6–23)
CALCIUM SERPL-MCNC: 8.6 MG/DL (ref 8.6–10.2)
CHLORIDE BLD-SCNC: 98 MMOL/L (ref 98–107)
CO2: 27 MMOL/L (ref 22–29)
CREAT SERPL-MCNC: 1.1 MG/DL (ref 0.5–1)
D DIMER: 337 NG/ML DDU
EOSINOPHILS ABSOLUTE: 0.01 E9/L (ref 0.05–0.5)
EOSINOPHILS RELATIVE PERCENT: 0.4 % (ref 0–6)
GFR AFRICAN AMERICAN: 59
GFR NON-AFRICAN AMERICAN: 49 ML/MIN/1.73
GLUCOSE BLD-MCNC: 124 MG/DL (ref 74–99)
HCT VFR BLD CALC: 43.6 % (ref 34–48)
HEMOGLOBIN: 14.5 G/DL (ref 11.5–15.5)
IMMATURE GRANULOCYTES #: 0.03 E9/L
IMMATURE GRANULOCYTES %: 1.2 % (ref 0–5)
LIPASE: 139 U/L (ref 13–60)
LYMPHOCYTES ABSOLUTE: 1.03 E9/L (ref 1.5–4)
LYMPHOCYTES RELATIVE PERCENT: 40.7 % (ref 20–42)
MAGNESIUM: 2.1 MG/DL (ref 1.6–2.6)
MCH RBC QN AUTO: 31.6 PG (ref 26–35)
MCHC RBC AUTO-ENTMCNC: 33.3 % (ref 32–34.5)
MCV RBC AUTO: 95 FL (ref 80–99.9)
MONOCYTES ABSOLUTE: 0.27 E9/L (ref 0.1–0.95)
MONOCYTES RELATIVE PERCENT: 10.7 % (ref 2–12)
NEUTROPHILS ABSOLUTE: 1.19 E9/L (ref 1.8–7.3)
NEUTROPHILS RELATIVE PERCENT: 47 % (ref 43–80)
PDW BLD-RTO: 14.5 FL (ref 11.5–15)
PLATELET # BLD: 111 E9/L (ref 130–450)
PMV BLD AUTO: 11.2 FL (ref 7–12)
POTASSIUM REFLEX MAGNESIUM: 3.5 MMOL/L (ref 3.5–5)
PRO-BNP: 14 PG/ML (ref 0–125)
RBC # BLD: 4.59 E12/L (ref 3.5–5.5)
SARS-COV-2: DETECTED
SODIUM BLD-SCNC: 138 MMOL/L (ref 132–146)
TOTAL PROTEIN: 7.5 G/DL (ref 6.4–8.3)
TROPONIN: <0.01 NG/ML (ref 0–0.03)
WBC # BLD: 2.5 E9/L (ref 4.5–11.5)

## 2021-04-26 PROCEDURE — 2140000000 HC CCU INTERMEDIATE R&B

## 2021-04-26 PROCEDURE — 2580000003 HC RX 258: Performed by: RADIOLOGY

## 2021-04-26 PROCEDURE — 96375 TX/PRO/DX INJ NEW DRUG ADDON: CPT

## 2021-04-26 PROCEDURE — XW033E5 INTRODUCTION OF REMDESIVIR ANTI-INFECTIVE INTO PERIPHERAL VEIN, PERCUTANEOUS APPROACH, NEW TECHNOLOGY GROUP 5: ICD-10-PCS | Performed by: INTERNAL MEDICINE

## 2021-04-26 PROCEDURE — 99285 EMERGENCY DEPT VISIT HI MDM: CPT

## 2021-04-26 PROCEDURE — 83735 ASSAY OF MAGNESIUM: CPT

## 2021-04-26 PROCEDURE — 83880 ASSAY OF NATRIURETIC PEPTIDE: CPT

## 2021-04-26 PROCEDURE — 71275 CT ANGIOGRAPHY CHEST: CPT

## 2021-04-26 PROCEDURE — 93005 ELECTROCARDIOGRAM TRACING: CPT | Performed by: STUDENT IN AN ORGANIZED HEALTH CARE EDUCATION/TRAINING PROGRAM

## 2021-04-26 PROCEDURE — 85378 FIBRIN DEGRADE SEMIQUANT: CPT

## 2021-04-26 PROCEDURE — 71045 X-RAY EXAM CHEST 1 VIEW: CPT

## 2021-04-26 PROCEDURE — 85025 COMPLETE CBC W/AUTO DIFF WBC: CPT

## 2021-04-26 PROCEDURE — 6360000002 HC RX W HCPCS: Performed by: STUDENT IN AN ORGANIZED HEALTH CARE EDUCATION/TRAINING PROGRAM

## 2021-04-26 PROCEDURE — 96372 THER/PROPH/DIAG INJ SC/IM: CPT

## 2021-04-26 PROCEDURE — 6360000004 HC RX CONTRAST MEDICATION: Performed by: RADIOLOGY

## 2021-04-26 PROCEDURE — 96374 THER/PROPH/DIAG INJ IV PUSH: CPT

## 2021-04-26 PROCEDURE — 80053 COMPREHEN METABOLIC PANEL: CPT

## 2021-04-26 PROCEDURE — 6370000000 HC RX 637 (ALT 250 FOR IP): Performed by: STUDENT IN AN ORGANIZED HEALTH CARE EDUCATION/TRAINING PROGRAM

## 2021-04-26 PROCEDURE — 83690 ASSAY OF LIPASE: CPT

## 2021-04-26 PROCEDURE — 84484 ASSAY OF TROPONIN QUANT: CPT

## 2021-04-26 PROCEDURE — 2580000003 HC RX 258: Performed by: STUDENT IN AN ORGANIZED HEALTH CARE EDUCATION/TRAINING PROGRAM

## 2021-04-26 RX ORDER — SODIUM CHLORIDE 0.9 % (FLUSH) 0.9 %
10 SYRINGE (ML) INJECTION
Status: COMPLETED | OUTPATIENT
Start: 2021-04-26 | End: 2021-04-26

## 2021-04-26 RX ORDER — ONDANSETRON 2 MG/ML
4 INJECTION INTRAMUSCULAR; INTRAVENOUS ONCE
Status: COMPLETED | OUTPATIENT
Start: 2021-04-26 | End: 2021-04-26

## 2021-04-26 RX ORDER — 0.9 % SODIUM CHLORIDE 0.9 %
1000 INTRAVENOUS SOLUTION INTRAVENOUS ONCE
Status: COMPLETED | OUTPATIENT
Start: 2021-04-26 | End: 2021-04-26

## 2021-04-26 RX ORDER — DEXAMETHASONE SODIUM PHOSPHATE 10 MG/ML
6 INJECTION INTRAMUSCULAR; INTRAVENOUS ONCE
Status: COMPLETED | OUTPATIENT
Start: 2021-04-26 | End: 2021-04-26

## 2021-04-26 RX ORDER — ACETAMINOPHEN 325 MG/1
650 TABLET ORAL ONCE
Status: COMPLETED | OUTPATIENT
Start: 2021-04-26 | End: 2021-04-26

## 2021-04-26 RX ADMIN — IOPAMIDOL 60 ML: 755 INJECTION, SOLUTION INTRAVENOUS at 22:02

## 2021-04-26 RX ADMIN — ONDANSETRON 4 MG: 2 INJECTION INTRAMUSCULAR; INTRAVENOUS at 20:24

## 2021-04-26 RX ADMIN — ENOXAPARIN SODIUM 60 MG: 60 INJECTION SUBCUTANEOUS at 23:24

## 2021-04-26 RX ADMIN — Medication 10 ML: at 22:03

## 2021-04-26 RX ADMIN — DEXAMETHASONE SODIUM PHOSPHATE 6 MG: 10 INJECTION INTRAMUSCULAR; INTRAVENOUS at 23:22

## 2021-04-26 RX ADMIN — ACETAMINOPHEN 650 MG: 325 TABLET ORAL at 20:24

## 2021-04-26 RX ADMIN — SODIUM CHLORIDE 1000 ML: 9 INJECTION, SOLUTION INTRAVENOUS at 20:24

## 2021-04-26 ASSESSMENT — PAIN SCALES - GENERAL: PAINLEVEL_OUTOF10: 4

## 2021-04-26 ASSESSMENT — ENCOUNTER SYMPTOMS
CONSTIPATION: 0
SORE THROAT: 0
BACK PAIN: 0
PHOTOPHOBIA: 0
VOMITING: 0
RHINORRHEA: 0
SHORTNESS OF BREATH: 1
ABDOMINAL PAIN: 0
COUGH: 1
NAUSEA: 1
APNEA: 0
DIARRHEA: 1
WHEEZING: 0
EYE PAIN: 0
TROUBLE SWALLOWING: 0
CHEST TIGHTNESS: 0

## 2021-04-26 NOTE — ED NOTES
Bed: 23  Expected date:   Expected time:   Means of arrival:   Comments:     Jorge Brito RN  04/26/21 [id number]

## 2021-04-27 LAB
ALBUMIN SERPL-MCNC: 3.2 G/DL (ref 3.5–5.2)
ALP BLD-CCNC: 67 U/L (ref 35–104)
ALT SERPL-CCNC: 9 U/L (ref 0–32)
ANION GAP SERPL CALCULATED.3IONS-SCNC: 11 MMOL/L (ref 7–16)
ANISOCYTOSIS: ABNORMAL
AST SERPL-CCNC: 17 U/L (ref 0–31)
BASOPHILS ABSOLUTE: 0 E9/L (ref 0–0.2)
BASOPHILS RELATIVE PERCENT: 0.6 % (ref 0–2)
BILIRUB SERPL-MCNC: <0.2 MG/DL (ref 0–1.2)
BUN BLDV-MCNC: 17 MG/DL (ref 6–23)
C-REACTIVE PROTEIN: 1.7 MG/DL (ref 0–0.4)
CALCIUM SERPL-MCNC: 7.2 MG/DL (ref 8.6–10.2)
CHLORIDE BLD-SCNC: 104 MMOL/L (ref 98–107)
CO2: 22 MMOL/L (ref 22–29)
CREAT SERPL-MCNC: 0.9 MG/DL (ref 0.5–1)
D DIMER: 227 NG/ML DDU
EKG ATRIAL RATE: 79 BPM
EKG P AXIS: -3 DEGREES
EKG P-R INTERVAL: 130 MS
EKG Q-T INTERVAL: 390 MS
EKG QRS DURATION: 80 MS
EKG QTC CALCULATION (BAZETT): 447 MS
EKG R AXIS: 24 DEGREES
EKG T AXIS: -178 DEGREES
EKG VENTRICULAR RATE: 79 BPM
EOSINOPHILS ABSOLUTE: 0 E9/L (ref 0.05–0.5)
EOSINOPHILS RELATIVE PERCENT: 0.6 % (ref 0–6)
GFR AFRICAN AMERICAN: >60
GFR NON-AFRICAN AMERICAN: >60 ML/MIN/1.73
GLUCOSE BLD-MCNC: 143 MG/DL (ref 74–99)
HCT VFR BLD CALC: 37.8 % (ref 34–48)
HEMOGLOBIN: 12.4 G/DL (ref 11.5–15.5)
LYMPHOCYTES ABSOLUTE: 0.4 E9/L (ref 1.5–4)
LYMPHOCYTES RELATIVE PERCENT: 25.2 % (ref 20–42)
MAGNESIUM: 1.9 MG/DL (ref 1.6–2.6)
MCH RBC QN AUTO: 31.2 PG (ref 26–35)
MCHC RBC AUTO-ENTMCNC: 32.8 % (ref 32–34.5)
MCV RBC AUTO: 95 FL (ref 80–99.9)
METAMYELOCYTES RELATIVE PERCENT: 0.9 % (ref 0–1)
MONOCYTES ABSOLUTE: 0.03 E9/L (ref 0.1–0.95)
MONOCYTES RELATIVE PERCENT: 1.7 % (ref 2–12)
NEUTROPHILS ABSOLUTE: 1.17 E9/L (ref 1.8–7.3)
NEUTROPHILS RELATIVE PERCENT: 72.2 % (ref 43–80)
OVALOCYTES: ABNORMAL
PDW BLD-RTO: 14.4 FL (ref 11.5–15)
PLATELET # BLD: 99 E9/L (ref 130–450)
PLATELET CONFIRMATION: NORMAL
PMV BLD AUTO: 11 FL (ref 7–12)
POIKILOCYTES: ABNORMAL
POTASSIUM REFLEX MAGNESIUM: 3.5 MMOL/L (ref 3.5–5)
PROCALCITONIN: 0.08 NG/ML (ref 0–0.08)
RBC # BLD: 3.98 E12/L (ref 3.5–5.5)
SODIUM BLD-SCNC: 137 MMOL/L (ref 132–146)
TOTAL PROTEIN: 5.9 G/DL (ref 6.4–8.3)
WBC # BLD: 1.6 E9/L (ref 4.5–11.5)

## 2021-04-27 PROCEDURE — 86140 C-REACTIVE PROTEIN: CPT

## 2021-04-27 PROCEDURE — 83735 ASSAY OF MAGNESIUM: CPT

## 2021-04-27 PROCEDURE — 85378 FIBRIN DEGRADE SEMIQUANT: CPT

## 2021-04-27 PROCEDURE — 2580000003 HC RX 258: Performed by: FAMILY MEDICINE

## 2021-04-27 PROCEDURE — 99223 1ST HOSP IP/OBS HIGH 75: CPT | Performed by: INTERNAL MEDICINE

## 2021-04-27 PROCEDURE — 80053 COMPREHEN METABOLIC PANEL: CPT

## 2021-04-27 PROCEDURE — 6360000002 HC RX W HCPCS: Performed by: FAMILY MEDICINE

## 2021-04-27 PROCEDURE — 2140000000 HC CCU INTERMEDIATE R&B

## 2021-04-27 PROCEDURE — 84145 PROCALCITONIN (PCT): CPT

## 2021-04-27 PROCEDURE — 85025 COMPLETE CBC W/AUTO DIFF WBC: CPT

## 2021-04-27 PROCEDURE — 36415 COLL VENOUS BLD VENIPUNCTURE: CPT

## 2021-04-27 PROCEDURE — 2500000003 HC RX 250 WO HCPCS: Performed by: INTERNAL MEDICINE

## 2021-04-27 PROCEDURE — 6370000000 HC RX 637 (ALT 250 FOR IP): Performed by: FAMILY MEDICINE

## 2021-04-27 PROCEDURE — 6370000000 HC RX 637 (ALT 250 FOR IP): Performed by: EMERGENCY MEDICINE

## 2021-04-27 PROCEDURE — 2580000003 HC RX 258: Performed by: INTERNAL MEDICINE

## 2021-04-27 PROCEDURE — 2700000000 HC OXYGEN THERAPY PER DAY

## 2021-04-27 PROCEDURE — 87040 BLOOD CULTURE FOR BACTERIA: CPT

## 2021-04-27 PROCEDURE — 2500000003 HC RX 250 WO HCPCS: Performed by: FAMILY MEDICINE

## 2021-04-27 RX ORDER — ONDANSETRON 2 MG/ML
4 INJECTION INTRAMUSCULAR; INTRAVENOUS EVERY 6 HOURS PRN
Status: DISCONTINUED | OUTPATIENT
Start: 2021-04-27 | End: 2021-04-30 | Stop reason: HOSPADM

## 2021-04-27 RX ORDER — ACETAMINOPHEN 650 MG/1
650 SUPPOSITORY RECTAL EVERY 6 HOURS PRN
Status: DISCONTINUED | OUTPATIENT
Start: 2021-04-27 | End: 2021-04-30 | Stop reason: HOSPADM

## 2021-04-27 RX ORDER — DEXAMETHASONE SODIUM PHOSPHATE 10 MG/ML
6 INJECTION INTRAMUSCULAR; INTRAVENOUS EVERY 24 HOURS
Status: DISCONTINUED | OUTPATIENT
Start: 2021-04-27 | End: 2021-04-27 | Stop reason: ALTCHOICE

## 2021-04-27 RX ORDER — DEXAMETHASONE 4 MG/1
6 TABLET ORAL DAILY
Status: DISCONTINUED | OUTPATIENT
Start: 2021-04-27 | End: 2021-04-30 | Stop reason: HOSPADM

## 2021-04-27 RX ORDER — PROMETHAZINE HYDROCHLORIDE 25 MG/1
12.5 TABLET ORAL EVERY 6 HOURS PRN
Status: DISCONTINUED | OUTPATIENT
Start: 2021-04-27 | End: 2021-04-30 | Stop reason: HOSPADM

## 2021-04-27 RX ORDER — POLYETHYLENE GLYCOL 3350 17 G/17G
17 POWDER, FOR SOLUTION ORAL DAILY PRN
Status: DISCONTINUED | OUTPATIENT
Start: 2021-04-27 | End: 2021-04-30 | Stop reason: HOSPADM

## 2021-04-27 RX ORDER — ACETAMINOPHEN 325 MG/1
650 TABLET ORAL EVERY 6 HOURS PRN
Status: DISCONTINUED | OUTPATIENT
Start: 2021-04-27 | End: 2021-04-30 | Stop reason: HOSPADM

## 2021-04-27 RX ORDER — ATORVASTATIN CALCIUM 40 MG/1
40 TABLET, FILM COATED ORAL NIGHTLY
Status: DISCONTINUED | OUTPATIENT
Start: 2021-04-27 | End: 2021-04-30 | Stop reason: HOSPADM

## 2021-04-27 RX ORDER — IPRATROPIUM BROMIDE AND ALBUTEROL SULFATE 2.5; .5 MG/3ML; MG/3ML
1 SOLUTION RESPIRATORY (INHALATION)
Status: DISCONTINUED | OUTPATIENT
Start: 2021-04-27 | End: 2021-04-27

## 2021-04-27 RX ORDER — SODIUM CHLORIDE 0.9 % (FLUSH) 0.9 %
5-40 SYRINGE (ML) INJECTION PRN
Status: DISCONTINUED | OUTPATIENT
Start: 2021-04-27 | End: 2021-04-30 | Stop reason: HOSPADM

## 2021-04-27 RX ORDER — SODIUM CHLORIDE 9 MG/ML
25 INJECTION, SOLUTION INTRAVENOUS PRN
Status: DISCONTINUED | OUTPATIENT
Start: 2021-04-27 | End: 2021-04-30 | Stop reason: HOSPADM

## 2021-04-27 RX ORDER — SODIUM CHLORIDE 0.9 % (FLUSH) 0.9 %
5-40 SYRINGE (ML) INJECTION EVERY 12 HOURS SCHEDULED
Status: DISCONTINUED | OUTPATIENT
Start: 2021-04-27 | End: 2021-04-30 | Stop reason: HOSPADM

## 2021-04-27 RX ORDER — GUAIFENESIN/DEXTROMETHORPHAN 100-10MG/5
5 SYRUP ORAL EVERY 4 HOURS PRN
Status: DISCONTINUED | OUTPATIENT
Start: 2021-04-27 | End: 2021-04-30 | Stop reason: HOSPADM

## 2021-04-27 RX ORDER — 0.9 % SODIUM CHLORIDE 0.9 %
30 INTRAVENOUS SOLUTION INTRAVENOUS PRN
Status: DISCONTINUED | OUTPATIENT
Start: 2021-04-27 | End: 2021-04-30 | Stop reason: HOSPADM

## 2021-04-27 RX ADMIN — REMDESIVIR 200 MG: 100 INJECTION, POWDER, LYOPHILIZED, FOR SOLUTION INTRAVENOUS at 13:23

## 2021-04-27 RX ADMIN — ATORVASTATIN CALCIUM 40 MG: 40 TABLET, FILM COATED ORAL at 22:25

## 2021-04-27 RX ADMIN — DOXYCYCLINE 100 MG: 100 INJECTION, POWDER, LYOPHILIZED, FOR SOLUTION INTRAVENOUS at 01:11

## 2021-04-27 RX ADMIN — Medication 10 ML: at 22:26

## 2021-04-27 RX ADMIN — DEXAMETHASONE 6 MG: 4 TABLET ORAL at 13:23

## 2021-04-27 RX ADMIN — Medication 10 ML: at 10:45

## 2021-04-27 RX ADMIN — IPRATROPIUM BROMIDE AND ALBUTEROL 1 PUFF: 20; 100 SPRAY, METERED RESPIRATORY (INHALATION) at 22:26

## 2021-04-27 RX ADMIN — CEFTRIAXONE 1000 MG: 1 INJECTION, POWDER, FOR SOLUTION INTRAMUSCULAR; INTRAVENOUS at 01:00

## 2021-04-27 RX ADMIN — ENOXAPARIN SODIUM 60 MG: 60 INJECTION SUBCUTANEOUS at 09:39

## 2021-04-27 RX ADMIN — ENOXAPARIN SODIUM 60 MG: 60 INJECTION SUBCUTANEOUS at 22:25

## 2021-04-27 RX ADMIN — ACETAMINOPHEN 650 MG: 325 TABLET ORAL at 22:26

## 2021-04-27 RX ADMIN — ATORVASTATIN CALCIUM 40 MG: 40 TABLET, FILM COATED ORAL at 01:11

## 2021-04-27 ASSESSMENT — PAIN SCALES - GENERAL
PAINLEVEL_OUTOF10: 6
PAINLEVEL_OUTOF10: 0
PAINLEVEL_OUTOF10: 6

## 2021-04-27 NOTE — ED NOTES
Pt ambulated to restroom, was found not to have her O2 in her nose, was 86% on RA while sleeping.       Brett Merchant RN  04/27/21 [id number]

## 2021-04-27 NOTE — ED NOTES
Patient ambulated on room air heart rate was in the 80's the whole time and pulse ox was never under 93%     Radha Hernandez RN  04/26/21 [id number]

## 2021-04-27 NOTE — PROGRESS NOTES
Hospitalist Progress Note      PCP: Ken Kumar MD    Date of Admission: 4/26/2021  Days in the hospital: 1    Hospital Course:   39-year-old patient with history of dyslipidemia who comes into hospital with complaints of cough and shortness of breath along with nausea and diarrhea. Patient was noted to have COVID-19 infection. CT chest was done which showed small PE and also bilateral infiltrates. Subjective  Patient seen and examined at bedside. Hypoxic, currently on 2 L oxygen, on full dose anticoagulation with Lovenox. Denies any chest pain, has shortness of breath. Exam:    /67   Pulse 57   Temp 98.2 °F (36.8 °C) (Temporal)   Resp 18   Ht 5' 1\" (1.549 m)   Wt 140 lb (63.5 kg)   SpO2 96%   BMI 26.45 kg/m²     HEENT: No pallor, no icterus. Respiratory:  CTA, good air entry. Cardiovascular: RRR, no murmur. Abdomen: Soft, non-tender, BS noted. Musculoskeletal: No joint pains or joint swelling noted. Neurologic: awake, alert and following commands       Assessment/Plan:  Active Hospital Problems    Diagnosis Date Noted    Pulmonary embolism on left [facility]) [I26.99] 04/26/2021     · COVID-19 pneumonia  · Acute hypoxic respiratory failure    Plan:  · Continue dexamethasone, will add remdesivir  · Continue anticoagulation, currently on Lovenox  · Continue oxygen and titrate as tolerated  · Continue statin      Labs:   Recent Labs     04/26/21 2017 04/27/21  0512   WBC 2.5* 1.6*   HGB 14.5 12.4   HCT 43.6 37.8   * 99*     Recent Labs     04/26/21 2017 04/27/21  0512    137   K 3.5 3.5   CL 98 104   CO2 27 22   BUN 19 17   CREATININE 1.1* 0.9   CALCIUM 8.6 7.2*     Recent Labs     04/26/21 2017 04/27/21  0512   AST 21 17   ALT 12 9   BILITOT 0.3 <0.2   ALKPHOS 84 67     No results for input(s): INR in the last 72 hours.   Recent Labs     04/26/21 2017   TROPONINI <0.01       Medications:  Reviewed    Infusion Medications    sodium chloride       Scheduled Medications    atorvastatin  40 mg Oral Nightly    sodium chloride flush  5-40 mL Intravenous 2 times per day    enoxaparin  1 mg/kg Subcutaneous BID    albuterol-ipratropium  1 puff Inhalation Q6H WA    dexamethasone  6 mg Oral Daily    [START ON 4/28/2021] remdesivir IVPB  100 mg Intravenous Q24H     PRN Meds: sodium chloride flush, sodium chloride, promethazine **OR** ondansetron, polyethylene glycol, acetaminophen **OR** acetaminophen, guaiFENesin-dextromethorphan, perflutren lipid microspheres, sodium chloride      Intake/Output Summary (Last 24 hours) at 4/27/2021 1439  Last data filed at 4/26/2021 2124  Gross per 24 hour   Intake 1000 ml   Output --   Net 1000 ml     Body mass index is 26.45 kg/m². · Diet  DIET GENERAL;    · Code Status  Full Code       Electronically signed by Peggy Regalado MD on 4/27/2021 at 2:39 PM  [employer]   Please contact me through perfect serve    NOTE: This report was transcribed using voice recognition software. Every effort was made to ensure accuracy; however, inadvertent computerized transcription errors may be present.

## 2021-04-27 NOTE — CONSULTS
Pulmonary [address] [facility]                             Pulmonary Consult/Progress Note :          Patient: Gm Fink  MRN: [id number]  : 1950      Date of Admission: .2021  7:59 PM    Consulting Physician: Dr. Sidney Mendoza    Reason for Consultation: Pulmonary embolism  CC : Fatigue, body aches, cough  HPI:   Gm Fink is a 69y.o. year old female with PMH of  hypertension, arthritis, HLD, intermittent panic attacks, with lumbar disc herniation presented to the ED with a 4 to 5-day history of fatigue, body aches and cough. On presentation patient complained of shortness of breath, diarrhea and nausea as well. Patient denies loss of taste, smell. In the emergency department patient was initially noted to be hypoxic and placed on 2 L nasal cannula. Chest x-ray was within normal limits. CTA chest was significant for left lower lung pulmonary embolus and pneumonia . Noted to be Covid positive, D-dimer of 337 patient was noted to be Covid positive. Patient received Decadron and Lovenox in the emergency department. Oxygen was later decreased to 1 L. Pulmonology was consulted for pulmonary embolism      PAST MEDICAL HISTORY:   Past Medical History:   Diagnosis Date    Hyperlipidemia     Hypertension        PAST SURGICAL HISTORY:   Past Surgical History:   Procedure Laterality Date    APPENDECTOMY       SECTION      HYSTERECTOMY         FAMILY HISTORY:   No family history on file. SOCIAL HISTORY:   Social History     Socioeconomic History    Marital status:       Spouse name: Not on file    Number of children: Not on file    Years of education: Not on file    Highest education level: Not on file   Occupational History    Not on file   Social Needs    Financial resource strain: Somewhat hard    Food insecurity     Worry: Never true     Inability: Never true    Transportation needs     Medical: No     Non-medical: No   Tobacco Use    suppository 650 mg, 650 mg, Rectal, Q6H PRN  guaiFENesin-dextromethorphan (ROBITUSSIN DM) 100-10 MG/5ML syrup 5 mL, 5 mL, Oral, Q4H PRN  doxycycline (VIBRAMYCIN) 100 mg in dextrose 5 % 100 mL IVPB, 100 mg, Intravenous, Q12H  cefTRIAXone (ROCEPHIN) 1,000 mg in sterile water 10 mL IV syringe, 1,000 mg, Intravenous, Q24H  perflutren lipid microspheres (DEFINITY) injection 1.65 mg, 1.5 mL, Intravenous, ONCE PRN  enoxaparin (LOVENOX) injection 60 mg, 1 mg/kg, Subcutaneous, BID  albuterol-ipratropium (COMBIVENT RESPIMAT)  MCG/ACT inhaler 1 puff, 1 puff, Inhalation, Q6H WA  dexamethasone (DECADRON) tablet 6 mg, 6 mg, Oral, Daily    IV MEDICATIONS:   sodium chloride         ALLERGIES:  No Known Allergies    REVIEW OF SYSTEMS:  General ROS:  No weight loss ,no fatigue     ENT ROS:   + Sore throat ,no lymphoadenopathy,no nasal stuffiness     Hematological and Lymphatic ROS:   No ecchymosis ,no tendency to bleed  Respiratory ROS:   Denies SOB, chest pain, orthopnea.  + cough   Cardiovascular ROS:   No CP,No Palpitation   Gastrointestinal ROS:   No Gi bleed,no nausea or vomiting. + watery bowel movements     - Musculoskeletal ROS:      - no joint swelling ,no joint pain   Neurological ROS:     -no weakness or numbness    Dermatological ROS:   No skin rash ,no urticaria     PHYSICAL EXAMINATION:     VITAL SIGNS:  /67   Pulse 57   Temp 98.2 °F (36.8 °C) (Temporal)   Resp 18   Ht 5' 1\" (1.549 m)   Wt 140 lb (63.5 kg)   SpO2 100%   BMI 26.45 kg/m²   Wt Readings from Last 3 Encounters:   04/26/21 140 lb (63.5 kg)   01/28/21 156 lb (70.8 kg)   01/16/21 157 lb 7 oz (71.4 kg)     Temp Readings from Last 3 Encounters:   04/27/21 98.2 °F (36.8 °C) (Temporal)   01/28/21 97.7 °F (36.5 °C) (Temporal)   01/17/21 97.8 °F (36.6 °C)     TMAX:  BP Readings from Last 3 Encounters:   04/27/21 108/67   01/28/21 124/76   01/17/21 114/70     Pulse Readings from Last 3 Encounters:   04/27/21 57   01/28/21 61   01/17/21 63 INTAKE/OUTPUTS:  I/O last 3 completed shifts:   In: 1000 [IV Piggyback:1000]  Out: -     Intake/Output Summary (Last 24 hours) at 4/27/2021 0957  Last data filed at 4/26/2021 2124  Gross per 24 hour   Intake 1000 ml   Output --   Net 1000 ml       General Appearance: alert and oriented to person, place and time, well-developed and   well-nourished, in no acute distress   Eyes: pupils equal, round, and reactive to light, extraocular eye movements intact, conjunctivae normal and sclera anicteric   Neck: neck supple and non tender without mass, no thyromegaly, no thyroid nodules and no cervical adenopathy   Pulmonary/Chest: CTAB, no wheezing, rhonchi or rales   Cardiovascular: normal rate, regular rhythm, normal S1 and S2, no murmurs, rubs, clicks or gallops, distal pulses intact, no carotid bruits, no murmurs, no gallops, no carotid bruits and no JVD   Abdomen: obese, soft, non-tender, non-distended, normal bowel sounds, no masses or organomegaly   Extremities:No LE edema, erythema, swelling or tenderness   Musculoskeletal: normal range of motion, no joint swelling, deformity or tenderness   Neurologic: reflexes normal and symmetric, no cranial nerve deficit noted    LABS/IMAGING:    CBC:  Lab Results   Component Value Date    WBC 1.6 (L) 04/27/2021    HGB 12.4 04/27/2021    HCT 37.8 04/27/2021    MCV 95.0 04/27/2021    PLT 99 (L) 04/27/2021    LYMPHOPCT 25.2 04/27/2021    RBC 3.98 04/27/2021    MCH 31.2 04/27/2021    MCHC 32.8 04/27/2021    RDW 14.4 04/27/2021    NEUTOPHILPCT 72.2 04/27/2021    MONOPCT 1.7 (L) 04/27/2021    BASOPCT 0.6 04/27/2021    NEUTROABS 1.17 (L) 04/27/2021    LYMPHSABS 0.40 (L) 04/27/2021    MONOSABS 0.03 (L) 04/27/2021    EOSABS 0.00 (L) 04/27/2021    BASOSABS 0.00 04/27/2021       Recent Labs     04/27/21  0512 04/26/21 2017   WBC 1.6* 2.5*   HGB 12.4 14.5   HCT 37.8 43.6   MCV 95.0 95.0   PLT 99* 111*       BMP:   Recent Labs     04/26/21 2017 04/27/21  0512    137   K 3.5 3.5   CL 98 104   CO2 27 22   BUN 19 17   CREATININE 1.1* 0.9       MG:   Lab Results   Component Value Date    MG 1.9 04/27/2021     Ca/Phos:   Lab Results   Component Value Date    CALCIUM 7.2 (L) 04/27/2021     Amylase: No results found for: AMYLASE  Lipase:   Lab Results   Component Value Date    LIPASE 139 (H) 04/26/2021     LIVER PROFILE:   Recent Labs     04/26/21 2017 04/27/21  0512   AST 21 17   ALT 12 9   LIPASE 139*  --    BILITOT 0.3 <0.2   ALKPHOS 84 67       PT/INR: No results for input(s): PROTIME, INR in the last 72 hours. APTT: No results for input(s): APTT in the last 72 hours. Cardiac Enzymes:  Lab Results   Component Value Date    TROPONINI <0.01 04/26/2021         MICROBIOLOGY: COVID-19 positive-4/26/21    CXR: 4/26/2021- No acute cardiopulmonary abnormality    PFTs: None available at this time    2D Echocardiogram: January 2018    CT Chest: 4/26/2021- Probable small peripheral emboli in the left lower lobe. Small scattered alveolar opacities in the lungs consistent with pneumonia     PROBLEM LIST:  Patient Active Problem List   Diagnosis    Venous stasis    Lymphedema    Isolated proteinuria with morphologic lesion    AMS (altered mental status)    Altered mental status    Panic attacks    Weakness    Gastroenteritis    Hypoalbuminemia    Hypocalcemia    Other neutropenia (HCC)    Pulmonary embolism on left [facility])       ASSESSMENT:  58-year-old female with past medical history of hyperlipidemia, hypertension -on compliant with medication who initially presented with cough, fatigue and body aches was diagnosed with COVID-19 pneumonia. CTA chest was also significant for questionable pulmonary small embolism of left lower lobe with mild patchy infiltrates, correlating with Covid pneumonia. 1. COVID-19 pneumonia  -CTA chest significant for small scattered pulmonary opacities.   Tested +4/26.  -Started on Decadron and remdesivir by primary  -D-dimer 337, CRP-1.7, pro-Angel

## 2021-04-27 NOTE — PROGRESS NOTES
Notification of IV to PO conversion: This patient's order for dexamethasone IV has been changed to PO as approved by the Pharmacy & Therapeutics and Medical Executive Committees. If the patient should become strict NPO while on this therapy, contact the prescriber for further orders.

## 2021-04-27 NOTE — H&P
Hospital Medicine History & Physical      PCP: Stefan Cleveland MD    Date of Admission: 2021    Date of Service: Pt seen/examined on 2021  and Admitted to Inpatient with expected LOS greater than two midnights due to medical therapy. Chief Complaint:  SOB       History Of Present Illness:     69 y.o. female with past medical history of hyperlipidemia . She present to the ED due to 4-5 days of fatigue  body aches coughing . She reports shortness of breath and diarrhea and nausea . Patient tested COVID positive today . She denies any recent travel. She states that she lives with family . She denies loss of taste or smell . ED course revealed patient was bordeline hypoxic in ED . CTA chest revealed LLL pulmonary embolus and pneumonia , Trop<0.01 WBC 2.5 DDimer  337 CXR revealed no acute abnormality creatinine was 1.1 . Patient received Decadron and Lovenox in the ED  Oxygen saturation in ED was 90-97%   Past Medical History:          Diagnosis Date    Hyperlipidemia     Hypertension        Past Surgical History:          Procedure Laterality Date    APPENDECTOMY       SECTION      HYSTERECTOMY         Medications Prior to Admission:      Prior to Admission medications    Medication Sig Start Date End Date Taking? Authorizing Provider   atorvastatin (LIPITOR) 40 MG tablet Take 1 tablet by mouth nightly 21   Sonam Mclean MD       Allergies:  Patient has no known allergies. Social History:      The patient currently lives with family     TOBACCO:   reports that she has never smoked. She has never used smokeless tobacco.  ETOH:   reports no history of alcohol use. Family History:      Reviewed in detail and negative for DM, CAD, Cancer, CVA. Positive as follows:    No family history on file. REVIEW OF SYSTEMS:   Pertinent positives as noted in the HPI. All other systems reviewed and negative.     PHYSICAL EXAM:    /67   Pulse 66   Temp 97.1 °F (36.2 °C) (Temporal) Resp 18   Ht 5' 1\" (1.549 m)   Wt 140 lb (63.5 kg)   SpO2 96%   BMI 26.45 kg/m²     General appearance:  No apparent distress, appears stated age and cooperative. HEENT:  Normal cephalic, atraumatic without obvious deformity. Pupils equal, round, and reactive to light. Extra ocular muscles intact. Conjunctivae/corneas clear. Neck: Supple, with full range of motion. No jugular venous distention. Trachea midline. Respiratory:  Normal respiratory effort. Clear to auscultation, bilaterally without Rales/Wheezes/Rhonchi. Cardiovascular:  Regular rate and rhythm with normal S1/S2 without murmurs, rubs or gallops. Abdomen: Soft, non-tender, non-distended with normal bowel sounds. Musculoskeletal:  No clubbing, cyanosis or edema bilaterally. Full range of motion without deformity. Skin: Skin color, texture, turgor normal.  No rashes or lesions. Neurologic:  Neurovascularly intact without any focal sensory/motor deficits. Cranial nerves: II-XII intact, grossly non-focal.  Psychiatric:  Alert and oriented, thought content appropriate, normal insight      CTA   1. Limited evaluation for pulmonary embolism due to respiratory motion   artifact.  Probable small peripheral emboli in the left lower lobe. 2. Small scattered alveolar opacities in the lungs consistent with pneumonia. 3. Cholelithiasis.             CXR     No acute cardiopulmonary abnormality. Labs:     Recent Labs     04/26/21 2017   WBC 2.5*   HGB 14.5   HCT 43.6   *     Recent Labs     04/26/21 2017      K 3.5   CL 98   CO2 27   BUN 19   CREATININE 1.1*   CALCIUM 8.6     Recent Labs     04/26/21 2017   AST 21   ALT 12   BILITOT 0.3   ALKPHOS 84     No results for input(s): INR in the last 72 hours.   Recent Labs     04/26/21 2017   TROPONINI <0.01       Urinalysis:      Lab Results   Component Value Date    NITRU Negative 01/16/2021    WBCUA 0-1 01/16/2021    BACTERIA RARE 01/16/2021    RBCUA 0-1 01/16/2021    BLOODU Negative 01/16/2021    SPECGRAV 1.025 01/16/2021    GLUCOSEU Negative 01/16/2021         ASSESSMENT:    Active Hospital Problems    Diagnosis Date Noted    Pulmonary embolism on left [facility]) [I26.99] 04/26/2021       PLAN:    COVID 19 Pneumonia : as seen on CTA on admission alveloar opacities  . Patient tested positive on 4/26 . O2 saturation is was 90-97% in ED  so not a candidate for remedesivir  WBC is 2.5 DDimer 337 trop <0.01 Admit inpatient vitals telemetry IV Rocephin and Doxycycline ,Decadron  trend Procal and can Dc abx based on results , blood cultures Legionella and Strep antigens       Acute Pulmonary Embolus : as seen on CTA on admission left lower lobe .  C/w therapeutic Lovenox monitor O2 saturation will obtain 2 D echo  Pulmonology consulted     Leukopenia : WBC 2.5 monitor due to COVID           DVT Prophylaxis: Lovenox   Diet:  Cardiac   Code Status: Prior         Manual MD Jeny

## 2021-04-27 NOTE — ED PROVIDER NOTES
though oxygen saturation fluctuates from 90% to 96% on room air. D-dimer positive. CTA obtained that showed probable small peripheral pulmonary embolus in the left lower lobe. There is also scattered opacities consistent with viral pneumonia. No cytosis. Discussed the case with hospitalist Dr. Ramos Members, who accept patient for further evaluation. Started patient on Lovenox. Deferred antibiotics to inpatient team as no obvious signs of bacterial etiology. She likely does have Covid viral pneumonia. Metabolic panel showed normal electrolytes, creatinine 1.1 slightly elevated from baseline. EKG showed non specific t wave inversions; no st segment elevation or depression; troponin was negative and this likely acute cardiac etiology of her symptoms such as ACS. She did have leukopenia consistent with a viral illness. Patient remained hemodynamically stable while in the ED.                    --------------------------------------------- PAST HISTORY ---------------------------------------------  Past Medical History:  has a past medical history of Hyperlipidemia and Hypertension. Past Surgical History:  has a past surgical history that includes Hysterectomy; Appendectomy; and  section. Social History:  reports that she has never smoked. She has never used smokeless tobacco. She reports that she does not drink alcohol or use drugs. Family History: family history is not on file. The patients home medications have been reviewed. Allergies: Patient has no known allergies.     -------------------------------------------------- RESULTS -------------------------------------------------    LABS:  Results for orders placed or performed during the hospital encounter of 21   CBC Auto Differential   Result Value Ref Range    WBC 2.5 (L) 4.5 - 11.5 E9/L    RBC 4.59 3.50 - 5.50 E12/L    Hemoglobin 14.5 11.5 - 15.5 g/dL    Hematocrit 43.6 34.0 - 48.0 %    MCV 95.0 80.0 - 99.9 fL    MCH 31.6 26.0 - 12.0 fL    Neutrophils % 72.2 43.0 - 80.0 %    Lymphocytes % 25.2 20.0 - 42.0 %    Monocytes % 1.7 (L) 2.0 - 12.0 %    Eosinophils % 0.6 0.0 - 6.0 %    Basophils % 0.6 0.0 - 2.0 %    Neutrophils Absolute 1.17 (L) 1.80 - 7.30 E9/L    Lymphocytes Absolute 0.40 (L) 1.50 - 4.00 E9/L    Monocytes Absolute 0.03 (L) 0.10 - 0.95 E9/L    Eosinophils Absolute 0.00 (L) 0.05 - 0.50 E9/L    Basophils Absolute 0.00 0.00 - 0.20 E9/L    Metamyelocytes Relative 0.9 0.0 - 1.0 %    Anisocytosis 1+     Poikilocytes 1+     Ovalocytes 1+    Comprehensive Metabolic Panel w/ Reflex to MG   Result Value Ref Range    Sodium 137 132 - 146 mmol/L    Potassium reflex Magnesium 3.5 3.5 - 5.0 mmol/L    Chloride 104 98 - 107 mmol/L    CO2 22 22 - 29 mmol/L    Anion Gap 11 7 - 16 mmol/L    Glucose 143 (H) 74 - 99 mg/dL    BUN 17 6 - 23 mg/dL    CREATININE 0.9 0.5 - 1.0 mg/dL    GFR Non-African American >60 >=60 mL/min/1.73    GFR African American >60     Calcium 7.2 (L) 8.6 - 10.2 mg/dL    Total Protein 5.9 (L) 6.4 - 8.3 g/dL    Albumin 3.2 (L) 3.5 - 5.2 g/dL    Total Bilirubin <0.2 0.0 - 1.2 mg/dL    Alkaline Phosphatase 67 35 - 104 U/L    ALT 9 0 - 32 U/L    AST 17 0 - 31 U/L   Procalcitonin   Result Value Ref Range    Procalcitonin 0.08 0.00 - 0.08 ng/mL   C-Reactive Protein   Result Value Ref Range    CRP 1.7 (H) 0.0 - 0.4 mg/dL   D-Dimer, Quantitative   Result Value Ref Range    D-Dimer, Quant 227 ng/mL DDU   Platelet Confirmation   Result Value Ref Range    Platelet Confirmation CONFIRMED    Magnesium   Result Value Ref Range    Magnesium 1.9 1.6 - 2.6 mg/dL   COVID-19    Specimen: Nasopharyngeal Swab   Result Value Ref Range    SARS-CoV-2 Detected    EKG 12 Lead   Result Value Ref Range    Ventricular Rate 79 BPM    Atrial Rate 79 BPM    P-R Interval 130 ms    QRS Duration 80 ms    Q-T Interval 390 ms    QTc Calculation (Bazett) 447 ms    P Axis -3 degrees    R Axis 24 degrees    T Axis -178 degrees       RADIOLOGY:  CTA PULMONARY W CONTRAST Final Result   1. Limited evaluation for pulmonary embolism due to respiratory motion   artifact. Probable small peripheral emboli in the left lower lobe. 2. Small scattered alveolar opacities in the lungs consistent with pneumonia. 3. Cholelithiasis. XR CHEST PORTABLE   Final Result   No acute cardiopulmonary abnormality. EKG:  This EKG is signed and interpreted by me. Rate: 79bpm  Rhythm: sinus  Interpretation: Nonspecific T wave inversions. No ST segment elevation or depression. Comparison: New T wave inversions compared to previous EKG from January 15, 2021      ------------------------- NURSING NOTES AND VITALS REVIEWED ---------------------------  Date / Time Roomed:  4/26/2021  7:59 PM  ED Bed Assignment:  [id number]    The nursing notes within the ED encounter and vital signs as below have been reviewed. Patient Vitals for the past 24 hrs:   BP Temp Temp src Pulse Resp SpO2   04/27/21 2215 127/65 97.7 °F (36.5 °C) Infrared 71 18 97 %   04/27/21 1047 -- -- -- -- -- 96 %   04/27/21 1000 -- -- -- -- -- (!) 86 %   04/27/21 0930 -- -- -- -- -- 96 %   04/27/21 0915 108/67 98.2 °F (36.8 °C) Temporal 57 18 100 %   04/27/21 0733 117/75 97.1 °F (36.2 °C) Temporal 68 24 96 %   04/27/21 0642 110/61 -- -- 58 16 95 %   04/27/21 0516 107/65 -- -- 60 20 91 %   04/27/21 0429 -- -- -- 58 22 (!) 86 %   04/27/21 0232 101/62 -- -- 60 20 92 %       Oxygen Saturation Interpretation: abNormal    ------------------------------------------ PROGRESS NOTES ------------------------------------------    Counseling:  I have spoken with the patient and discussed todays results, in addition to providing specific details for the plan of care and counseling regarding the diagnosis and prognosis.   Their questions are answered at this time and they are agreeable with the plan of admission.    --------------------------------- ADDITIONAL PROVIDER NOTES ---------------------------------  Consultations:  Spoke with Dr. Keily Feliciano. Discussed case. They will admit the patient. This patient's ED course included: a personal history and physicial examination, re-evaluation prior to disposition, multiple bedside re-evaluations, IV medications, cardiac monitoring and continuous pulse oximetry    This patient has remained hemodynamically stable during their ED course. Diagnosis:  1. Pulmonary embolism on left ([address]    2. COVID-19    3. Dyspnea, unspecified type        Disposition:  Patient's disposition: Admit to telemetry  Patient's condition is stable.          Tray Duff DO  Resident  04/28/21 0917

## 2021-04-28 LAB
ALBUMIN SERPL-MCNC: 3.8 G/DL (ref 3.5–5.2)
ALP BLD-CCNC: 72 U/L (ref 35–104)
ALT SERPL-CCNC: 11 U/L (ref 0–32)
ANION GAP SERPL CALCULATED.3IONS-SCNC: 12 MMOL/L (ref 7–16)
ANISOCYTOSIS: ABNORMAL
AST SERPL-CCNC: 22 U/L (ref 0–31)
BASOPHILS ABSOLUTE: 0 E9/L (ref 0–0.2)
BASOPHILS RELATIVE PERCENT: 0.4 % (ref 0–2)
BILIRUB SERPL-MCNC: 0.2 MG/DL (ref 0–1.2)
BUN BLDV-MCNC: 18 MG/DL (ref 6–23)
BURR CELLS: ABNORMAL
C-REACTIVE PROTEIN: 1.6 MG/DL (ref 0–0.4)
CALCIUM SERPL-MCNC: 8.2 MG/DL (ref 8.6–10.2)
CHLORIDE BLD-SCNC: 99 MMOL/L (ref 98–107)
CO2: 25 MMOL/L (ref 22–29)
CREAT SERPL-MCNC: 0.9 MG/DL (ref 0.5–1)
D DIMER: <200 NG/ML DDU
EOSINOPHILS ABSOLUTE: 0 E9/L (ref 0.05–0.5)
EOSINOPHILS RELATIVE PERCENT: 0 % (ref 0–6)
GFR AFRICAN AMERICAN: >60
GFR NON-AFRICAN AMERICAN: >60 ML/MIN/1.73
GLUCOSE BLD-MCNC: 123 MG/DL (ref 74–99)
HCT VFR BLD CALC: 40.7 % (ref 34–48)
HEMOGLOBIN: 13.1 G/DL (ref 11.5–15.5)
LYMPHOCYTES ABSOLUTE: 0.48 E9/L (ref 1.5–4)
LYMPHOCYTES RELATIVE PERCENT: 16.5 % (ref 20–42)
MCH RBC QN AUTO: 30.7 PG (ref 26–35)
MCHC RBC AUTO-ENTMCNC: 32.2 % (ref 32–34.5)
MCV RBC AUTO: 95.3 FL (ref 80–99.9)
MONOCYTES ABSOLUTE: 0.22 E9/L (ref 0.1–0.95)
MONOCYTES RELATIVE PERCENT: 7.8 % (ref 2–12)
NEUTROPHILS ABSOLUTE: 2.13 E9/L (ref 1.8–7.3)
NEUTROPHILS RELATIVE PERCENT: 75.7 % (ref 43–80)
OVALOCYTES: ABNORMAL
PDW BLD-RTO: 14.6 FL (ref 11.5–15)
PLATELET # BLD: 110 E9/L (ref 130–450)
PMV BLD AUTO: 11.6 FL (ref 7–12)
POIKILOCYTES: ABNORMAL
POTASSIUM REFLEX MAGNESIUM: 3.7 MMOL/L (ref 3.5–5)
RBC # BLD: 4.27 E12/L (ref 3.5–5.5)
SODIUM BLD-SCNC: 136 MMOL/L (ref 132–146)
TEAR DROP CELLS: ABNORMAL
TOTAL PROTEIN: 6.8 G/DL (ref 6.4–8.3)
WBC # BLD: 2.8 E9/L (ref 4.5–11.5)

## 2021-04-28 PROCEDURE — 2580000003 HC RX 258: Performed by: INTERNAL MEDICINE

## 2021-04-28 PROCEDURE — 80053 COMPREHEN METABOLIC PANEL: CPT

## 2021-04-28 PROCEDURE — 6360000002 HC RX W HCPCS: Performed by: FAMILY MEDICINE

## 2021-04-28 PROCEDURE — 6370000000 HC RX 637 (ALT 250 FOR IP): Performed by: FAMILY MEDICINE

## 2021-04-28 PROCEDURE — 2500000003 HC RX 250 WO HCPCS: Performed by: INTERNAL MEDICINE

## 2021-04-28 PROCEDURE — 85025 COMPLETE CBC W/AUTO DIFF WBC: CPT

## 2021-04-28 PROCEDURE — 86140 C-REACTIVE PROTEIN: CPT

## 2021-04-28 PROCEDURE — 85378 FIBRIN DEGRADE SEMIQUANT: CPT

## 2021-04-28 PROCEDURE — 2140000000 HC CCU INTERMEDIATE R&B

## 2021-04-28 PROCEDURE — 2580000003 HC RX 258: Performed by: FAMILY MEDICINE

## 2021-04-28 PROCEDURE — 99232 SBSQ HOSP IP/OBS MODERATE 35: CPT | Performed by: INTERNAL MEDICINE

## 2021-04-28 PROCEDURE — 36415 COLL VENOUS BLD VENIPUNCTURE: CPT

## 2021-04-28 PROCEDURE — 2700000000 HC OXYGEN THERAPY PER DAY

## 2021-04-28 RX ADMIN — IPRATROPIUM BROMIDE AND ALBUTEROL 1 PUFF: 20; 100 SPRAY, METERED RESPIRATORY (INHALATION) at 14:30

## 2021-04-28 RX ADMIN — Medication 10 ML: at 21:28

## 2021-04-28 RX ADMIN — IPRATROPIUM BROMIDE AND ALBUTEROL 1 PUFF: 20; 100 SPRAY, METERED RESPIRATORY (INHALATION) at 09:53

## 2021-04-28 RX ADMIN — ACETAMINOPHEN 650 MG: 325 TABLET ORAL at 09:47

## 2021-04-28 RX ADMIN — ENOXAPARIN SODIUM 60 MG: 60 INJECTION SUBCUTANEOUS at 21:27

## 2021-04-28 RX ADMIN — ATORVASTATIN CALCIUM 40 MG: 40 TABLET, FILM COATED ORAL at 21:27

## 2021-04-28 RX ADMIN — ACETAMINOPHEN 650 MG: 325 TABLET ORAL at 16:59

## 2021-04-28 RX ADMIN — REMDESIVIR 100 MG: 100 INJECTION, POWDER, LYOPHILIZED, FOR SOLUTION INTRAVENOUS at 18:06

## 2021-04-28 RX ADMIN — DEXAMETHASONE 6 MG: 4 TABLET ORAL at 09:48

## 2021-04-28 RX ADMIN — IPRATROPIUM BROMIDE AND ALBUTEROL 1 PUFF: 20; 100 SPRAY, METERED RESPIRATORY (INHALATION) at 21:27

## 2021-04-28 RX ADMIN — ENOXAPARIN SODIUM 60 MG: 60 INJECTION SUBCUTANEOUS at 09:48

## 2021-04-28 ASSESSMENT — PAIN - FUNCTIONAL ASSESSMENT: PAIN_FUNCTIONAL_ASSESSMENT: ACTIVITIES ARE NOT PREVENTED

## 2021-04-28 ASSESSMENT — PAIN DESCRIPTION - FREQUENCY: FREQUENCY: INTERMITTENT

## 2021-04-28 ASSESSMENT — PAIN DESCRIPTION - ONSET
ONSET: ON-GOING
ONSET: ON-GOING

## 2021-04-28 ASSESSMENT — PAIN DESCRIPTION - PROGRESSION: CLINICAL_PROGRESSION: NOT CHANGED

## 2021-04-28 ASSESSMENT — PAIN SCALES - GENERAL
PAINLEVEL_OUTOF10: 2
PAINLEVEL_OUTOF10: 2
PAINLEVEL_OUTOF10: 6

## 2021-04-28 ASSESSMENT — PAIN DESCRIPTION - PAIN TYPE
TYPE: ACUTE PAIN
TYPE: ACUTE PAIN

## 2021-04-28 ASSESSMENT — PAIN DESCRIPTION - LOCATION: LOCATION: BACK;SHOULDER

## 2021-04-28 ASSESSMENT — PAIN DESCRIPTION - ORIENTATION: ORIENTATION: MID;LOWER

## 2021-04-28 NOTE — PROGRESS NOTES
Hospitalist Progress Note      PCP: Yamil Swift MD    Date of Admission: 4/26/2021  Days in the hospital: 2    Hospital Course:   58-year-old patient with history of dyslipidemia who comes into hospital with complaints of cough and shortness of breath along with nausea and diarrhea. Patient was noted to have COVID-19 infection. CT chest was done which showed small PE and also bilateral infiltrates. Subjective  Patient seen and examined at bedside. More tired today, complaining of cough. Currently on 1 L oxygen. She otherwise is afebrile, denies any chest pain or palpitations. Exam:    BP (!) 141/71   Pulse 86   Temp 97.7 °F (36.5 °C) (Infrared)   Resp 20   Ht 5' 1\" (1.549 m)   Wt 140 lb (63.5 kg)   SpO2 96%   BMI 26.45 kg/m²     HEENT: No pallor, no icterus. Respiratory:  CTA, good air entry. Cardiovascular: RRR, no murmur. Abdomen: Soft, non-tender, BS noted. Musculoskeletal: No joint pains or joint swelling noted. Neurologic: awake, alert and following commands       Assessment/Plan:  Active Hospital Problems    Diagnosis Date Noted    Pulmonary embolism on left ([address] [I26.99] 04/26/2021     · COVID-19 pneumonia  · Acute hypoxic respiratory failure  · Thrombocytopenia  · Neutropenia     Plan:  ? Continue dexamethasone and remdesivir  ? Continue anticoagulation, currently on Lovenox, can transition to oral anticoagulation upon discharge  ? Continue oxygen, clinical improving, currently on 1 L oxygen  ? Continue to monitor labs  ?  Continue statin      Labs:   Recent Labs     04/26/21 2017 04/27/21  0512 04/28/21  0601   WBC 2.5* 1.6* 2.8*   HGB 14.5 12.4 13.1   HCT 43.6 37.8 40.7   * 99* 110*     Recent Labs     04/26/21 2017 04/27/21  0512 04/28/21  0601    137 136   K 3.5 3.5 3.7   CL 98 104 99   CO2 27 22 25   BUN 19 17 18   CREATININE 1.1* 0.9 0.9   CALCIUM 8.6 7.2* 8.2*     Recent Labs     04/26/21 2017 04/27/21  0512 04/28/21  0601   AST 21 17 22   ALT 12 9 11   BILITOT 0.3 <0.2 0.2   ALKPHOS 84 67 72     No results for input(s): INR in the last 72 hours. Recent Labs     04/26/21 2017   TROPONINI <0.01       Medications:  Reviewed    Infusion Medications    sodium chloride       Scheduled Medications    atorvastatin  40 mg Oral Nightly    sodium chloride flush  5-40 mL Intravenous 2 times per day    enoxaparin  1 mg/kg Subcutaneous BID    albuterol-ipratropium  1 puff Inhalation Q6H WA    dexamethasone  6 mg Oral Daily    remdesivir IVPB  100 mg Intravenous Q24H     PRN Meds: sodium chloride flush, sodium chloride, promethazine **OR** ondansetron, polyethylene glycol, acetaminophen **OR** acetaminophen, guaiFENesin-dextromethorphan, perflutren lipid microspheres, sodium chloride      Intake/Output Summary (Last 24 hours) at 4/28/2021 0942  Last data filed at 4/28/2021 0556  Gross per 24 hour   Intake 350 ml   Output 0 ml   Net 350 ml     · Body mass index is 26.45 kg/m². · Diet  · DIET GENERAL;    · Code Status  · Full Code       Electronically signed by Evelyn Copeland MD on 4/28/2021 at 9:42 AM  [employer]   Please contact me through perfect serve    NOTE: This report was transcribed using voice recognition software. Every effort was made to ensure accuracy; however, inadvertent computerized transcription errors may be present.

## 2021-04-28 NOTE — PLAN OF CARE
Problem: Breathing Pattern - Ineffective:  Goal: Ability to achieve and maintain a regular respiratory rate will improve  Description: Ability to achieve and maintain a regular respiratory rate will improve  Outcome: Met This Shift     Problem: Gas Exchange - Impaired  Goal: Absence of hypoxia  Outcome: Met This Shift     Problem: Breathing Pattern - Ineffective  Goal: Ability to achieve and maintain a regular respiratory rate  Outcome: Met This Shift

## 2021-04-28 NOTE — PROGRESS NOTES
Pulmonary [address] [facility]                             Pulmonary Consult/Progress Note :        Reason for Consultation: Pulmonary embolism  CC : Fatigue, body aches, cough  HPI:   Doing much better  SOB has improved  On 1 L o2     PHYSICAL EXAMINATION:     VITAL SIGNS:  BP (!) 141/71   Pulse 86   Temp 97.7 °F (36.5 °C) (Infrared)   Resp 20   Ht 5' 1\" (1.549 m)   Wt 140 lb (63.5 kg)   SpO2 96%   BMI 26.45 kg/m²   Wt Readings from Last 3 Encounters:   04/26/21 140 lb (63.5 kg)   01/28/21 156 lb (70.8 kg)   01/16/21 157 lb 7 oz (71.4 kg)     Temp Readings from Last 3 Encounters:   04/28/21 97.7 °F (36.5 °C) (Infrared)   01/28/21 97.7 °F (36.5 °C) (Temporal)   01/17/21 97.8 °F (36.6 °C)     TMAX:  BP Readings from Last 3 Encounters:   04/28/21 (!) 141/71   01/28/21 124/76   01/17/21 114/70     Pulse Readings from Last 3 Encounters:   04/28/21 86   01/28/21 61   01/17/21 63           INTAKE/OUTPUTS:  I/O last 3 completed shifts:   In: 350 [P.O.:340; I.V.:10]  Out: 0     Intake/Output Summary (Last 24 hours) at 4/28/2021 1306  Last data filed at 4/28/2021 0556  Gross per 24 hour   Intake 350 ml   Output 0 ml   Net 350 ml       General Appearance: alert and oriented to person, place and time, well-developed and   well-nourished, in no acute distress   Eyes: pupils equal, round, and reactive to light, extraocular eye movements intact, conjunctivae normal and sclera anicteric   Neck: neck supple and non tender without mass, no thyromegaly, no thyroid nodules and no cervical adenopathy   Pulmonary/Chest: CTAB, no wheezing, rhonchi or rales   Cardiovascular: normal rate, regular rhythm, normal S1 and S2, no murmurs, rubs, clicks or gallops, distal pulses intact, no carotid bruits, no murmurs, no gallops, no carotid bruits and no JVD   Abdomen: obese, soft, non-tender, non-distended, normal bowel sounds, no masses or organomegaly   Extremities:No LE edema, erythema, swelling or tenderness   Musculoskeletal: normal range of motion, no joint swelling, deformity or tenderness   Neurologic: reflexes normal and symmetric, no cranial nerve deficit noted    LABS/IMAGING:    CBC:  Lab Results   Component Value Date    WBC 2.8 (L) 04/28/2021    HGB 13.1 04/28/2021    HCT 40.7 04/28/2021    MCV 95.3 04/28/2021     (L) 04/28/2021    LYMPHOPCT 16.5 (L) 04/28/2021    RBC 4.27 04/28/2021    MCH 30.7 04/28/2021    MCHC 32.2 04/28/2021    RDW 14.6 04/28/2021    NEUTOPHILPCT 75.7 04/28/2021    MONOPCT 7.8 04/28/2021    BASOPCT 0.4 04/28/2021    NEUTROABS 2.13 04/28/2021    LYMPHSABS 0.48 (L) 04/28/2021    MONOSABS 0.22 04/28/2021    EOSABS 0.00 (L) 04/28/2021    BASOSABS 0.00 04/28/2021       Recent Labs     04/28/21  0601 04/27/21  0512 04/26/21 2017   WBC 2.8* 1.6* 2.5*   HGB 13.1 12.4 14.5   HCT 40.7 37.8 43.6   MCV 95.3 95.0 95.0   * 99* 111*       BMP:   Recent Labs     04/26/21 2017 04/27/21  0512 04/28/21  0601    137 136   K 3.5 3.5 3.7   CL 98 104 99   CO2 27 22 25   BUN 19 17 18   CREATININE 1.1* 0.9 0.9       MG:   Lab Results   Component Value Date    MG 1.9 04/27/2021     Ca/Phos:   Lab Results   Component Value Date    CALCIUM 8.2 (L) 04/28/2021     Amylase: No results found for: AMYLASE  Lipase:   Lab Results   Component Value Date    LIPASE 139 (H) 04/26/2021     LIVER PROFILE:   Recent Labs     04/26/21 2017 04/27/21  0512 04/28/21  0601   AST 21 17 22   ALT 12 9 11   LIPASE 139*  --   --    BILITOT 0.3 <0.2 0.2   ALKPHOS 84 67 72       PT/INR: No results for input(s): PROTIME, INR in the last 72 hours. APTT: No results for input(s): APTT in the last 72 hours.     Cardiac Enzymes:  Lab Results   Component Value Date    TROPONINI <0.01 04/26/2021         MICROBIOLOGY: COVID-19 positive-4/26/21    CXR: 4/26/2021- No acute cardiopulmonary abnormality    PFTs: None available at this time    2D Echocardiogram: January 2018    CT Chest: 4/26/2021- Probable small peripheral emboli in the left lower lobe. Small scattered alveolar opacities in the lungs consistent with pneumonia     PROBLEM LIST:  Patient Active Problem List   Diagnosis    Venous stasis    Lymphedema    Isolated proteinuria with morphologic lesion    AMS (altered mental status)    Altered mental status    Panic attacks    Weakness    Gastroenteritis    Hypoalbuminemia    Hypocalcemia    Other neutropenia (HCC)    Pulmonary embolism on left [facility])       ASSESSMENT:  79-year-old female with past medical history of hyperlipidemia, hypertension -on compliant with medication who initially presented with cough, fatigue and body aches was diagnosed with COVID-19 pneumonia. CTA chest was also significant for questionable pulmonary small embolism of left lower lobe with mild patchy infiltrates, correlating with Covid pneumonia. 1. COVID-19 pneumonia  -CTA chest significant for small scattered pulmonary opacities. Tested +4/26.  -Started on Decadron and remdesivir by primary  -D-dimer 337, CRP-1.7, pro-Angel 0.08    2.  Pulmonary embolism  -D-dimer 337, troponin less than 0.01, BNP-14   -CTA concerning for left lower lobe PE, will need 3 months of anticoagulation  -On therapeutic dose Lovenox      PLAN:  *-Follow echocardiogram  *-Continue remdesivir and Decadron per primary  *-Continue therapeutic dose Lovenox  *-start  Eliquis tomorrow, will need 3 months of oral         anticoagulation  *-Wean off of oxygen as able      Larisa Hess MD,[facility]  Pulmonary&Critical Care Medicine   Director of [address] [place] [address] Director of [address] [place] [address]   Professor Ulises Ac

## 2021-04-28 NOTE — PLAN OF CARE
Problem: Breathing Pattern - Ineffective:  Goal: Ability to achieve and maintain a regular respiratory rate will improve  Description: Ability to achieve and maintain a regular respiratory rate will improve  4/28/2021 1451 by Nelosn Oconnor RN  Outcome: Met This Shift  4/28/2021 0104 by Pancho Aguiar RN  Outcome: Met This Shift     Problem: Airway Clearance - Ineffective  Goal: Achieve or maintain patent airway  4/28/2021 1451 by Nelson Oconnor RN  Outcome: Met This Shift  4/28/2021 0104 by Pancho Aguiar RN  Outcome: Met This Shift

## 2021-04-29 LAB
ALBUMIN SERPL-MCNC: 3.4 G/DL (ref 3.5–5.2)
ALP BLD-CCNC: 67 U/L (ref 35–104)
ALT SERPL-CCNC: 11 U/L (ref 0–32)
ANION GAP SERPL CALCULATED.3IONS-SCNC: 10 MMOL/L (ref 7–16)
AST SERPL-CCNC: 23 U/L (ref 0–31)
BASOPHILS ABSOLUTE: 0.01 E9/L (ref 0–0.2)
BASOPHILS RELATIVE PERCENT: 0.2 % (ref 0–2)
BILIRUB SERPL-MCNC: 0.3 MG/DL (ref 0–1.2)
BUN BLDV-MCNC: 15 MG/DL (ref 6–23)
C-REACTIVE PROTEIN: 4.5 MG/DL (ref 0–0.4)
CALCIUM SERPL-MCNC: 8.4 MG/DL (ref 8.6–10.2)
CHLORIDE BLD-SCNC: 101 MMOL/L (ref 98–107)
CO2: 26 MMOL/L (ref 22–29)
CREAT SERPL-MCNC: 0.9 MG/DL (ref 0.5–1)
D DIMER: 225 NG/ML DDU
EOSINOPHILS ABSOLUTE: 0 E9/L (ref 0.05–0.5)
EOSINOPHILS RELATIVE PERCENT: 0 % (ref 0–6)
GFR AFRICAN AMERICAN: >60
GFR NON-AFRICAN AMERICAN: >60 ML/MIN/1.73
GLUCOSE BLD-MCNC: 110 MG/DL (ref 74–99)
HCT VFR BLD CALC: 41.9 % (ref 34–48)
HEMOGLOBIN: 13.8 G/DL (ref 11.5–15.5)
IMMATURE GRANULOCYTES #: 0.2 E9/L
IMMATURE GRANULOCYTES %: 4.1 % (ref 0–5)
LYMPHOCYTES ABSOLUTE: 1.25 E9/L (ref 1.5–4)
LYMPHOCYTES RELATIVE PERCENT: 25.6 % (ref 20–42)
MCH RBC QN AUTO: 31.2 PG (ref 26–35)
MCHC RBC AUTO-ENTMCNC: 32.9 % (ref 32–34.5)
MCV RBC AUTO: 94.6 FL (ref 80–99.9)
MONOCYTES ABSOLUTE: 0.38 E9/L (ref 0.1–0.95)
MONOCYTES RELATIVE PERCENT: 7.8 % (ref 2–12)
NEUTROPHILS ABSOLUTE: 3.04 E9/L (ref 1.8–7.3)
NEUTROPHILS RELATIVE PERCENT: 62.3 % (ref 43–80)
PDW BLD-RTO: 14.6 FL (ref 11.5–15)
PLATELET # BLD: 112 E9/L (ref 130–450)
PMV BLD AUTO: 11.5 FL (ref 7–12)
POTASSIUM REFLEX MAGNESIUM: 4.5 MMOL/L (ref 3.5–5)
PROCALCITONIN: 0.18 NG/ML (ref 0–0.08)
RBC # BLD: 4.43 E12/L (ref 3.5–5.5)
SODIUM BLD-SCNC: 137 MMOL/L (ref 132–146)
TOTAL PROTEIN: 6.8 G/DL (ref 6.4–8.3)
WBC # BLD: 4.9 E9/L (ref 4.5–11.5)

## 2021-04-29 PROCEDURE — 2580000003 HC RX 258: Performed by: FAMILY MEDICINE

## 2021-04-29 PROCEDURE — 2500000003 HC RX 250 WO HCPCS: Performed by: INTERNAL MEDICINE

## 2021-04-29 PROCEDURE — 6370000000 HC RX 637 (ALT 250 FOR IP): Performed by: INTERNAL MEDICINE

## 2021-04-29 PROCEDURE — 85025 COMPLETE CBC W/AUTO DIFF WBC: CPT

## 2021-04-29 PROCEDURE — 2580000003 HC RX 258: Performed by: INTERNAL MEDICINE

## 2021-04-29 PROCEDURE — 6370000000 HC RX 637 (ALT 250 FOR IP): Performed by: FAMILY MEDICINE

## 2021-04-29 PROCEDURE — 2700000000 HC OXYGEN THERAPY PER DAY

## 2021-04-29 PROCEDURE — 99232 SBSQ HOSP IP/OBS MODERATE 35: CPT | Performed by: INTERNAL MEDICINE

## 2021-04-29 PROCEDURE — 36415 COLL VENOUS BLD VENIPUNCTURE: CPT

## 2021-04-29 PROCEDURE — 85378 FIBRIN DEGRADE SEMIQUANT: CPT

## 2021-04-29 PROCEDURE — 6360000002 HC RX W HCPCS: Performed by: FAMILY MEDICINE

## 2021-04-29 PROCEDURE — 84145 PROCALCITONIN (PCT): CPT

## 2021-04-29 PROCEDURE — 86140 C-REACTIVE PROTEIN: CPT

## 2021-04-29 PROCEDURE — 2140000000 HC CCU INTERMEDIATE R&B

## 2021-04-29 PROCEDURE — 80053 COMPREHEN METABOLIC PANEL: CPT

## 2021-04-29 RX ORDER — LIDOCAINE 4 G/G
1 PATCH TOPICAL DAILY
Status: DISCONTINUED | OUTPATIENT
Start: 2021-04-29 | End: 2021-04-30 | Stop reason: HOSPADM

## 2021-04-29 RX ADMIN — DEXAMETHASONE 6 MG: 4 TABLET ORAL at 08:26

## 2021-04-29 RX ADMIN — IPRATROPIUM BROMIDE AND ALBUTEROL 1 PUFF: 20; 100 SPRAY, METERED RESPIRATORY (INHALATION) at 13:02

## 2021-04-29 RX ADMIN — APIXABAN 10 MG: 5 TABLET, FILM COATED ORAL at 19:35

## 2021-04-29 RX ADMIN — Medication 10 ML: at 19:35

## 2021-04-29 RX ADMIN — ACETAMINOPHEN 650 MG: 325 TABLET ORAL at 08:26

## 2021-04-29 RX ADMIN — ENOXAPARIN SODIUM 60 MG: 60 INJECTION SUBCUTANEOUS at 08:26

## 2021-04-29 RX ADMIN — IPRATROPIUM BROMIDE AND ALBUTEROL 1 PUFF: 20; 100 SPRAY, METERED RESPIRATORY (INHALATION) at 19:36

## 2021-04-29 RX ADMIN — ACETAMINOPHEN 650 MG: 325 TABLET ORAL at 13:02

## 2021-04-29 RX ADMIN — IPRATROPIUM BROMIDE AND ALBUTEROL 1 PUFF: 20; 100 SPRAY, METERED RESPIRATORY (INHALATION) at 08:27

## 2021-04-29 RX ADMIN — REMDESIVIR 100 MG: 100 INJECTION, POWDER, LYOPHILIZED, FOR SOLUTION INTRAVENOUS at 17:45

## 2021-04-29 RX ADMIN — Medication 10 ML: at 08:26

## 2021-04-29 RX ADMIN — ATORVASTATIN CALCIUM 40 MG: 40 TABLET, FILM COATED ORAL at 19:35

## 2021-04-29 ASSESSMENT — PAIN SCALES - GENERAL
PAINLEVEL_OUTOF10: 5
PAINLEVEL_OUTOF10: 4
PAINLEVEL_OUTOF10: 5

## 2021-04-29 NOTE — PLAN OF CARE
Problem: Breathing Pattern - Ineffective:  Goal: Ability to achieve and maintain a regular respiratory rate will improve  Description: Ability to achieve and maintain a regular respiratory rate will improve  4/29/2021 0027 by Prema Conley RN  Outcome: Met This Shift  4/28/2021 1451 by Salma Valenzuela RN  Outcome: Met This Shift     Problem: Gas Exchange - Impaired  Goal: Absence of hypoxia  4/29/2021 0027 by Prema Conley RN  Outcome: Met This Shift  4/28/2021 1451 by Salma Valenzuela RN  Outcome: Met This Shift     Problem: Breathing Pattern - Ineffective  Goal: Ability to achieve and maintain a regular respiratory rate  4/29/2021 0027 by Prema Conley RN  Outcome: Met This Shift  4/28/2021 1451 by Salma Valenzuela RN  Outcome: Met This Shift

## 2021-04-30 ENCOUNTER — TELEPHONE (OUTPATIENT)
Dept: INTERNAL MEDICINE | Age: 71
End: 2021-04-30

## 2021-04-30 VITALS
OXYGEN SATURATION: 94 % | TEMPERATURE: 97.5 F | BODY MASS INDEX: 32.51 KG/M2 | RESPIRATION RATE: 20 BRPM | WEIGHT: 172.2 LBS | SYSTOLIC BLOOD PRESSURE: 135 MMHG | DIASTOLIC BLOOD PRESSURE: 58 MMHG | HEIGHT: 61 IN | HEART RATE: 78 BPM

## 2021-04-30 LAB
ALBUMIN SERPL-MCNC: 3.2 G/DL (ref 3.5–5.2)
ALP BLD-CCNC: 61 U/L (ref 35–104)
ALT SERPL-CCNC: 11 U/L (ref 0–32)
ANION GAP SERPL CALCULATED.3IONS-SCNC: 12 MMOL/L (ref 7–16)
ANISOCYTOSIS: ABNORMAL
AST SERPL-CCNC: 23 U/L (ref 0–31)
BASOPHILS ABSOLUTE: 0 E9/L (ref 0–0.2)
BASOPHILS RELATIVE PERCENT: 0.4 % (ref 0–2)
BILIRUB SERPL-MCNC: 0.3 MG/DL (ref 0–1.2)
BUN BLDV-MCNC: 11 MG/DL (ref 6–23)
CALCIUM SERPL-MCNC: 8.3 MG/DL (ref 8.6–10.2)
CHLORIDE BLD-SCNC: 101 MMOL/L (ref 98–107)
CO2: 26 MMOL/L (ref 22–29)
CREAT SERPL-MCNC: 0.7 MG/DL (ref 0.5–1)
EOSINOPHILS ABSOLUTE: 0 E9/L (ref 0.05–0.5)
EOSINOPHILS RELATIVE PERCENT: 0.6 % (ref 0–6)
GFR AFRICAN AMERICAN: >60
GFR NON-AFRICAN AMERICAN: >60 ML/MIN/1.73
GLUCOSE BLD-MCNC: 124 MG/DL (ref 74–99)
HCT VFR BLD CALC: 39 % (ref 34–48)
HEMOGLOBIN: 13.3 G/DL (ref 11.5–15.5)
LYMPHOCYTES ABSOLUTE: 0.98 E9/L (ref 1.5–4)
LYMPHOCYTES RELATIVE PERCENT: 10.4 % (ref 20–42)
MCH RBC QN AUTO: 31.4 PG (ref 26–35)
MCHC RBC AUTO-ENTMCNC: 34.1 % (ref 32–34.5)
MCV RBC AUTO: 92.2 FL (ref 80–99.9)
MONOCYTES ABSOLUTE: 0.49 E9/L (ref 0.1–0.95)
MONOCYTES RELATIVE PERCENT: 5.2 % (ref 2–12)
MYELOCYTE PERCENT: 0.9 % (ref 0–0)
NEUTROPHILS ABSOLUTE: 8.23 E9/L (ref 1.8–7.3)
NEUTROPHILS RELATIVE PERCENT: 83.5 % (ref 43–80)
OVALOCYTES: ABNORMAL
PDW BLD-RTO: 14.4 FL (ref 11.5–15)
PLATELET # BLD: 108 E9/L (ref 130–450)
PMV BLD AUTO: 11.2 FL (ref 7–12)
POIKILOCYTES: ABNORMAL
POLYCHROMASIA: ABNORMAL
POTASSIUM REFLEX MAGNESIUM: 3.7 MMOL/L (ref 3.5–5)
RBC # BLD: 4.23 E12/L (ref 3.5–5.5)
SODIUM BLD-SCNC: 139 MMOL/L (ref 132–146)
TOTAL PROTEIN: 6.3 G/DL (ref 6.4–8.3)
WBC # BLD: 9.8 E9/L (ref 4.5–11.5)

## 2021-04-30 PROCEDURE — 2580000003 HC RX 258: Performed by: FAMILY MEDICINE

## 2021-04-30 PROCEDURE — 87449 NOS EACH ORGANISM AG IA: CPT

## 2021-04-30 PROCEDURE — 6370000000 HC RX 637 (ALT 250 FOR IP): Performed by: FAMILY MEDICINE

## 2021-04-30 PROCEDURE — 85025 COMPLETE CBC W/AUTO DIFF WBC: CPT

## 2021-04-30 PROCEDURE — 6370000000 HC RX 637 (ALT 250 FOR IP): Performed by: INTERNAL MEDICINE

## 2021-04-30 PROCEDURE — 6360000002 HC RX W HCPCS: Performed by: FAMILY MEDICINE

## 2021-04-30 PROCEDURE — 80053 COMPREHEN METABOLIC PANEL: CPT

## 2021-04-30 PROCEDURE — 2700000000 HC OXYGEN THERAPY PER DAY

## 2021-04-30 PROCEDURE — 97535 SELF CARE MNGMENT TRAINING: CPT

## 2021-04-30 PROCEDURE — 97165 OT EVAL LOW COMPLEX 30 MIN: CPT

## 2021-04-30 PROCEDURE — 36415 COLL VENOUS BLD VENIPUNCTURE: CPT

## 2021-04-30 RX ORDER — GUAIFENESIN/DEXTROMETHORPHAN 100-10MG/5
5 SYRUP ORAL EVERY 4 HOURS PRN
Qty: 120 ML | Refills: 0 | Status: SHIPPED | OUTPATIENT
Start: 2021-04-30 | End: 2021-05-10

## 2021-04-30 RX ORDER — DEXAMETHASONE 6 MG/1
6 TABLET ORAL DAILY
Qty: 7 TABLET | Refills: 0 | Status: SHIPPED | OUTPATIENT
Start: 2021-04-27 | End: 2021-05-07

## 2021-04-30 RX ADMIN — DEXAMETHASONE 6 MG: 4 TABLET ORAL at 08:48

## 2021-04-30 RX ADMIN — ONDANSETRON 4 MG: 2 INJECTION INTRAMUSCULAR; INTRAVENOUS at 09:18

## 2021-04-30 RX ADMIN — Medication 10 ML: at 08:48

## 2021-04-30 RX ADMIN — APIXABAN 10 MG: 5 TABLET, FILM COATED ORAL at 08:48

## 2021-04-30 RX ADMIN — GUAIFENESIN SYRUP AND DEXTROMETHORPHAN 5 ML: 100; 10 SYRUP ORAL at 09:17

## 2021-04-30 RX ADMIN — IPRATROPIUM BROMIDE AND ALBUTEROL 1 PUFF: 20; 100 SPRAY, METERED RESPIRATORY (INHALATION) at 15:00

## 2021-04-30 RX ADMIN — IPRATROPIUM BROMIDE AND ALBUTEROL 1 PUFF: 20; 100 SPRAY, METERED RESPIRATORY (INHALATION) at 09:00

## 2021-04-30 RX ADMIN — ACETAMINOPHEN 650 MG: 325 TABLET ORAL at 09:18

## 2021-04-30 ASSESSMENT — PAIN SCALES - GENERAL
PAINLEVEL_OUTOF10: 5
PAINLEVEL_OUTOF10: 5

## 2021-04-30 NOTE — PROGRESS NOTES
OCCUPATIONAL THERAPY INITIAL EVALUATION      Date:2021  Patient Name: Prabha Yeh  MRN: [id number]  : 1950  Room: [address] [place] [address]    Evaluating OT: Marlin Whittaker, MOT, OTR/L [id number]    AM-PAC Daily Activity Raw Score:   Recommended Adaptive Equipment: shower chair     Diagnosis: PE on the L   Referring Practitioner: Melvina Barrow DO  Surgery: n/a  Pertinent Medical History: HTN   Precautions:  Falls, O2, DROPLET (+)     Home Living: Pt lives son in a 1 story home; bed/bath on main level   Bathroom setup: tub shower unit   Equipment owned: ww  Prior Level of Function: IND with ADLs , assist with IADLs; using no AD for functional mobility   Driving: no  Occupation: works at [employer]    Pain Level: \"everywhere\"  Cognition: A&O: 3/4; Follows 1 step directions, with repetition and increased time   Memory:  good    Sequencing:  good    Problem solving:  fair    Judgement/safety:  fair     Functional Assessment:   Initial Eval Status  Date: 21 Treatment Status  Date: Short Term Goals  Treatment frequency: 2-3  x/week   Feeding SUP (pt able to open packages)  IND   Grooming SBA (standing at sink)   IND   UB Dressing SBA   IND   LB Dressing Mod A (pt required assist with socks due to fatigue)  SBA    Bathing Mod A (simulated)  Min A    Toileting SBA (pt able to complete pants management and todd hygiene)  ind   Bed Mobility  Log roll: SBA  Supine to sit: SBA   Sit to supine: SBA   Log roll IND  Supine to sit: IND   Sit to supine: IND   Functional Transfers Sit to stand:SBA   Stand to sit:SBA  Commode: SBA  IND   Functional Mobility SBA (using no AD, to/from bathroom)  SUP   Balance Sitting: SBA  Standing: SBA     Activity Tolerance fair     Visual/  Perceptual Glasses: yes              Hand dominance: R  UE ROM: RUE: WFL  LUE:  WFL  Strength: RUE: grossly 4-/5 LUE: grossly 4-/5   Strength: B WFL  Fine Motor Coordination:  WFL     Hearing: WFL  Sensation:  No c/o numbness/tingling  Tone: WFL  Edema: BLEs                            Comments:Cleared by RN to see pt. Upon arrival, patient supine and agreeable to OT session. At end of session, patient supine in bed with call light and phone within reach, all lines and tubes intact. Pt would benefit from continued OT to increase functional independence and quality of life. Treatment: Pt required vc's for proper technique/safety with hand placement/body mechanics/posture for bed mobility/ADLs/functional tranfers/mobility. Pt required vc's for sequencing/initiation of ADLs/functional transfers. Pt able to  sit EOB ~5 mins and stand at sink ~5 mins to increase core strength/balance/activity tolerance for ease with ADLs. Pt required increased time to complete ADLs/functional transfers due to rest breaks. Pt required skilled monitoring of SpO2 during session and educated on pursed lip breathing. Pt appeared to have tolerated session well and appears motivated/cooperative/pleasant . Pt instructed on use of call light for assistance and fall prevention. Pt demo'ing fair understanding of education provided. Continue to educate. Restin% on 2L  During activity: 88% on 2L  End of session: 91% on 2L    Eval Complexity: Low  · History: Expanded chart review of medical records and additional review of physical, cognitive, or psychosocial history related to current functional performance  · Exam: 3+ performance deficits  · Assistance/Modification: Min/mod assistance or modifications required to perform tasks. May have comorbidities that affect occupational performance. Assessment of current deficits   Functional mobility [x]  ADLs [x] Strength [x]  Cognition []  Functional transfers  [x] IADLs [x] Safety Awareness [x]  Endurance [x]  Fine Motor Coordination [] Balance [x] Vision/perception [] Sensation []   Gross Motor Coordination [] ROM [] Delirium []                  Motor Control []    Plan of Care:     Instruction/training on adapted ADL techniques and AE recommendations to increase functional independence within precautions  Training on energy conservation strategies/techniques to improve independence/tolerance for self-care routine  Functional transfer/mobility training/DME recommendations for increased independence, safety, and fall prevention  Patient/Family education to increase follow through with safety techniques and functional independence  Recommendation of environmental modifications for increased safety with functional transfers/mobility and ADLs  Therapeutic exercise to improve motor endurance, ROM, and functional strength for ADLs/functional transfers  Therapeutic activities to facilitate/challenge dynamic balance, stand tolerance, fine motor dexterity/in-hand manipulation for increased independence with ADLs  Neuro-muscular re-education: facilitation of righting/equilibrium reactions, midline orientation, scapular stability/mobility, normalization of muscle tone, and facilitation of volitional active controled movement    2-3 DAYS/WK FOR 1-2 WEEKS prn    Rehab Potential: Good for established goals, pt. assisted in establishment of goals. LTG: maximize independence with ADLs to return to PLOF    Patient instructed on diagnosis, prognosis/goals and plan of care. Demonstrated fair understanding. [] Malnutrition indicators have been identified and nursing has been notified to ensure a dietitian consult is ordered. Evaluation time includes thorough review of current medical information, gathering information on past medical & social history & PLOF, completion of standardized testing, informal observation of tasks, consultation with other medical professions/disciplines, assessment of data & development of POC/goals.      low Evaluation +     Treatment Time In: 2:00        Treatment Time Out: 2:15           Treatment Charges: Mins Units   Ther Ex  [id number]       Manual Therapy Danielala 128       ADL/Home t [phone] 1

## 2021-04-30 NOTE — PROGRESS NOTES
04/28/21  0601 04/29/21  0610 04/30/21  0519    137 139   K 3.7 4.5 3.7   CL 99 101 101   CO2 25 26 26   BUN 18 15 11   CREATININE 0.9 0.9 0.7   CALCIUM 8.2* 8.4* 8.3*     Recent Labs     04/28/21  0601 04/29/21  0610 04/30/21  0519   AST 22 23 23   ALT 11 11 11   BILITOT 0.2 0.3 0.3   ALKPHOS 72 67 61     No results for input(s): INR in the last 72 hours. No results for input(s): Mercy Castor in the last 72 hours. Recent Labs     04/28/21  0601 04/29/21  0610 04/30/21  0519   AST 22 23 23   ALT 11 11 11   BILITOT 0.2 0.3 0.3   ALKPHOS 72 67 61     No results for input(s): LACTA in the last 72 hours.   No results found for: Santo Christiansen  Lab Results   Component Value Date    AMMONIA 15.0 11/24/2019       Assessment:    Active Hospital Problems    Diagnosis Date Noted    Pulmonary embolism on left [facility]) [I26.99] 04/26/2021   covid pnem   acute hypoxic resp failure   cervical pain        Plan:  lidoderm patch  Cont redesivir and decadron  Change to eliquis        DVT Prophylaxis: *eliquis  Diet: DIET GENERAL;  Code Status: Full Code     PT/OT Eval Status: done     Dispo - *home*       Electronically signed by Irving Dempsey DO on 4/30/2021 at 11:30 AM [facility]

## 2021-04-30 NOTE — HOME CARE
Call received from [facility] in [place] [facility] med office to inform that the office will not follow home care until after patient has been seen and re-established. [facility] care will not be able to see patient until after there is a physician that will agree to follow home care. Message with update left with hospital , Jewels Kim.  Fareed Alexander lpn

## 2021-04-30 NOTE — DISCHARGE INSTR - COC
Continuity of Care Form    Patient Name: Chandler Bautista   :  1950  MRN:  [id number]    Admit date:  2021  Discharge date:  ***    Code Status Order: Full Code   Advance Directives:   Advance Care Flowsheet Documentation     Date/Time Healthcare Directive Type of Healthcare Directive Copy in [address] Agent's Name Healthcare Agent's Phone Number    21 [phone]  No, patient does not have an advance directive for healthcare treatment -- -- -- -- --          Admitting Physician:  Luciano Leija MD  PCP: Keith Yanez MD    Discharging Nurse: [facility] Unit/Room#: 2298/9039-B  Discharging Unit Phone Number: ***    Emergency Contact:   Extended Emergency Contact Information  Primary Emergency Contact: Ubalod Stone  Address: [address] [place] [address] Phone: [phone]  Relation: Child    Past Surgical History:  Past Surgical History:   Procedure Laterality Date    APPENDECTOMY       SECTION      HYSTERECTOMY         Immunization History:   Immunization History   Administered Date(s) Administered    Td, unspecified formulation 2012       Active Problems:  Patient Active Problem List   Diagnosis Code    Venous stasis I87.8    Lymphedema I89.0    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   Results obtained from outside lab    Resolved    C-diff Rule Out 21 CLOSTRIDIUM DIFFICILE EIA (Ordered)   21 Amado Ceja RN    COVID-19 Rule Out 21 COVID-19 (Ordered)   21 Rule-Out Test Resulted          Nurse Assessment:  Last Vital Signs: BP (!) 128/59   Pulse 78   Temp 97.9 °F (36.6 °C) (Oral)   Resp 19   Ht 5' 1\" (1.549 m)   Wt 172 lb 3.2 oz (78.1 kg)   SpO2 93%   BMI 32.54 kg/m²     Last documented pain score (0-10 scale): Pain Level: 5  Last Weight:   Wt Readings from Last 1 Encounters:   21 172 lb 3.2 oz (78.1 kg)     Mental Status:  {IP PT MENTAL STATUS:}    IV Access:  { MIKE IV ACCESS:927474987}    Nursing Mobility/ADLs:  Walking   {CHP DME PAHC:985568879}  Transfer  {CHP DME KZJP:319949841}  Bathing  {CHP DME PTDL:241716291}  Dressing  {CHP DME QQN}  Toileting  {CHP DME DEVIN:779704112}  Feeding  {CHP DME HWIY:247177309}  Med Admin  {CHP DME VVTD:553738865}  Med Delivery   { MIKE MED Delivery:583287945}    Wound Care Documentation and Therapy:        Elimination:  Continence:   · Bowel: {YES / KO:15091}  · Bladder: {YES / TU:57678}  Urinary Catheter: {Urinary Catheter:946632353}   Colostomy/Ileostomy/Ileal Conduit: {YES / OR:29540}       Date of Last BM: ***    Intake/Output Summary (Last 24 hours) at 2021 1303  Last data filed at 2021 2218  Gross per 24 hour   Intake 237 ml   Output --   Net 237 ml     I/O last 3 completed shifts: In: 491 [P.O.:252;  I.V.:225]  Out: -     Safety Concerns:     [employer] Safety Concerns:485083666}    Impairments/Disabilities:      [employer] Impairments/Disabilities:737622539}    Nutrition Therapy:  Current Nutrition Therapy:   [address] MIKE Diet List:937198371}    Routes of Feeding: {CHP DME Other Feedings:513608476}  Liquids: {Slp liquid thickness:98764}  Daily Fluid Restriction: {CHP DME Yes amt example:383059664}  Last Modified Barium Swallow with Video (Video Swallowing Test): {Done Not Done

## 2021-04-30 NOTE — PROGRESS NOTES
Hospitalist Progress Note      PCP: Elda Hahn MD    Date of Admission: 4/26/2021    Chief Complaint: SOB    Hospital Course   found to have a PE and COVI pos. She was started on lovenox,  Will change to eliquis today. Subjective: **has a complaint of neck pain      Medications:  Reviewed    Infusion Medications    sodium chloride       Scheduled Medications    apixaban  10 mg Oral BID    lidocaine  1 patch Transdermal Daily    atorvastatin  40 mg Oral Nightly    sodium chloride flush  5-40 mL Intravenous 2 times per day    albuterol-ipratropium  1 puff Inhalation Q6H WA    dexamethasone  6 mg Oral Daily    remdesivir IVPB  100 mg Intravenous Q24H     PRN Meds: sodium chloride flush, sodium chloride, promethazine **OR** ondansetron, polyethylene glycol, acetaminophen **OR** acetaminophen, guaiFENesin-dextromethorphan, perflutren lipid microspheres, sodium chloride      Intake/Output Summary (Last 24 hours) at 4/30/2021 0733  Last data filed at 4/29/2021 2218  Gross per 24 hour   Intake 477 ml   Output --   Net 477 ml       Exam:    /74   Pulse 84   Temp 98 °F (36.7 °C) (Temporal)   Resp 20   Ht 5' 1\" (1.549 m)   Wt 172 lb 3.2 oz (78.1 kg)   SpO2 92%   BMI 32.54 kg/m²           Gen: *well developed  HEENT: NC/AT, moist mucous membranes, no oropharyngeal erythema or exudate  Neck: supple, trachea midline, no anterior cervical or SC LAD  Heart:  Normal s1/s2, RRR, no murmurs, gallops, or rubs. Lungs:  *rhonchi noted * bilaterally,  Abd: bowel sounds present, soft, nontender, nondistended, no masses  Extrem:  No clubbing, cyanosis,  *no* edema  Skin: no rashes or lesions  Psych: A & O x3  Neuro: grossly intact, moves all four extremities.     Capillary Refill: Brisk,< 3 seconds   Peripheral Pulses: +2 palpable, equal bilaterally               Labs:   Recent Labs     04/28/21  0601 04/29/21  0610 04/30/21  0519   WBC 2.8* 4.9 9.8   HGB 13.1 13.8 13.3   HCT 40.7 41.9 39.0   * 112* 108*     Recent Labs     04/28/21  0601 04/29/21  0610 04/30/21  0519    137 139   K 3.7 4.5 3.7   CL 99 101 101   CO2 25 26 26   BUN 18 15 11   CREATININE 0.9 0.9 0.7   CALCIUM 8.2* 8.4* 8.3*     Recent Labs     04/28/21  0601 04/29/21  0610 04/30/21  0519   AST 22 23 23   ALT 11 11 11   BILITOT 0.2 0.3 0.3   ALKPHOS 72 67 61     No results for input(s): INR in the last 72 hours. No results for input(s): Marletta Shonto in the last 72 hours. Recent Labs     04/28/21  0601 04/29/21  0610 04/30/21  0519   AST 22 23 23   ALT 11 11 11   BILITOT 0.2 0.3 0.3   ALKPHOS 72 67 61     No results for input(s): LACTA in the last 72 hours.   No results found for: Trinna Ripper  Lab Results   Component Value Date    AMMONIA 15.0 11/24/2019       Assessment:    Active Hospital Problems    Diagnosis Date Noted    Pulmonary embolism on left [facility]) [I26.99] 04/26/2021   covid pnem   acute hypoxic resp failure   cervical pain      Plan:  lidoderm patch  Cont redesivir and decadron  Change to eliquis      DVT Prophylaxis: *eliquis  Diet: DIET GENERAL;  Code Status: Full Code    PT/OT Eval Status: done    Dispo - *home*      Electronically signed by Prashant Page DO on 4/30/2021 at 7:33 AM [facility]

## 2021-04-30 NOTE — FLOWSHEET NOTE
Discharge instructions given to patient, she seemed to have difficulty understanding when to take her medications. I called and spoke with her son, Jerry Aguiar and told him her instructions. He verbalized understanding and said he would assist her with medication regimen. IV site discontinued. Oxygen here for discharge.  To be taken by wheelchair to awaiting vehicle

## 2021-04-30 NOTE — PROGRESS NOTES
Pt pulse ox on room air is 88% resting and 87% ambulating. Pt pulse ox at rest on 2L NC is 94% and while ambulating is 93%.

## 2021-04-30 NOTE — PROGRESS NOTES
Pulmonary [address] [facility]                             Pulmonary Consult/Progress Note :        Reason for Consultation: Pulmonary embolism  CC : Fatigue, body aches, cough  HPI:   Poor historian   Sleeping most of the time and does not like to talk   Doing much better  SOB has improved  On 1 L o2     PHYSICAL EXAMINATION:     VITAL SIGNS:  /69   Pulse 67   Temp 97.1 °F (36.2 °C) (Oral)   Resp 18   Ht 5' 1\" (1.549 m)   Wt 172 lb (78 kg)   SpO2 93%   BMI 32.50 kg/m²   Wt Readings from Last 3 Encounters:   04/29/21 172 lb (78 kg)   01/28/21 156 lb (70.8 kg)   01/16/21 157 lb 7 oz (71.4 kg)     Temp Readings from Last 3 Encounters:   04/29/21 97.1 °F (36.2 °C) (Oral)   01/28/21 97.7 °F (36.5 °C) (Temporal)   01/17/21 97.8 °F (36.6 °C)     TMAX:  BP Readings from Last 3 Encounters:   04/29/21 117/69   01/28/21 124/76   01/17/21 114/70     Pulse Readings from Last 3 Encounters:   04/29/21 67   01/28/21 61   01/17/21 63           INTAKE/OUTPUTS:  I/O last 3 completed shifts: In: 250 [P.O.:240;  I.V.:10]  Out: 0     Intake/Output Summary (Last 24 hours) at 4/29/2021 2118  Last data filed at 4/29/2021 1818  Gross per 24 hour   Intake 465 ml   Output --   Net 465 ml       General Appearance: alert and oriented to person, place and time, well-developed and   well-nourished, in no acute distress   Eyes: pupils equal, round, and reactive to light, extraocular eye movements intact, conjunctivae normal and sclera anicteric   Neck: neck supple and non tender without mass, no thyromegaly, no thyroid nodules and no cervical adenopathy   Pulmonary/Chest: CTAB, no wheezing, rhonchi or rales   Cardiovascular: normal rate, regular rhythm, normal S1 and S2, no murmurs, rubs, clicks or gallops, distal pulses intact, no carotid bruits, no murmurs, no gallops, no carotid bruits and no JVD   Abdomen: obese, soft, non-tender, non-distended, normal bowel sounds, no masses or organomegaly Extremities:No LE edema, erythema, swelling or tenderness   Musculoskeletal: normal range of motion, no joint swelling, deformity or tenderness   Neurologic: reflexes normal and symmetric, no cranial nerve deficit noted    LABS/IMAGING:    CBC:  Lab Results   Component Value Date    WBC 4.9 04/29/2021    HGB 13.8 04/29/2021    HCT 41.9 04/29/2021    MCV 94.6 04/29/2021     (L) 04/29/2021    LYMPHOPCT 25.6 04/29/2021    RBC 4.43 04/29/2021    MCH 31.2 04/29/2021    MCHC 32.9 04/29/2021    RDW 14.6 04/29/2021    NEUTOPHILPCT 62.3 04/29/2021    MONOPCT 7.8 04/29/2021    BASOPCT 0.2 04/29/2021    NEUTROABS 3.04 04/29/2021    LYMPHSABS 1.25 (L) 04/29/2021    MONOSABS 0.38 04/29/2021    EOSABS 0.00 (L) 04/29/2021    BASOSABS 0.01 04/29/2021       Recent Labs     04/29/21  0610 04/28/21  0601 04/27/21  0512   WBC 4.9 2.8* 1.6*   HGB 13.8 13.1 12.4   HCT 41.9 40.7 37.8   MCV 94.6 95.3 95.0   * 110* 99*       BMP:   Recent Labs     04/27/21  0512 04/28/21  0601 04/29/21  0610    136 137   K 3.5 3.7 4.5    99 101   CO2 22 25 26   BUN 17 18 15   CREATININE 0.9 0.9 0.9       MG:   Lab Results   Component Value Date    MG 1.9 04/27/2021     Ca/Phos:   Lab Results   Component Value Date    CALCIUM 8.4 (L) 04/29/2021     Amylase: No results found for: AMYLASE  Lipase:   Lab Results   Component Value Date    LIPASE 139 (H) 04/26/2021     LIVER PROFILE:   Recent Labs     04/27/21  0512 04/28/21  0601 04/29/21  0610   AST 17 22 23   ALT 9 11 11   BILITOT <0.2 0.2 0.3   ALKPHOS 67 72 67       PT/INR: No results for input(s): PROTIME, INR in the last 72 hours. APTT: No results for input(s): APTT in the last 72 hours.     Cardiac Enzymes:  Lab Results   Component Value Date    TROPONINI <0.01 04/26/2021         MICROBIOLOGY: COVID-19 positive-4/26/21    CXR: 4/26/2021- No acute cardiopulmonary abnormality    PFTs: None available at this time    2D Echocardiogram: January 2018    CT Chest: 4/26/2021- Probable small peripheral emboli in the left lower lobe. Small scattered alveolar opacities in the lungs consistent with pneumonia     PROBLEM LIST:  Patient Active Problem List   Diagnosis    Venous stasis    Lymphedema    Isolated proteinuria with morphologic lesion    AMS (altered mental status)    Altered mental status    Panic attacks    Weakness    Gastroenteritis    Hypoalbuminemia    Hypocalcemia    Other neutropenia (HCC)    Pulmonary embolism on left [facility])       ASSESSMENT:  67-year-old female with past medical history of hyperlipidemia, hypertension -on compliant with medication who initially presented with cough, fatigue and body aches was diagnosed with COVID-19 pneumonia. CTA chest was also significant for questionable pulmonary small embolism of left lower lobe with mild patchy infiltrates, correlating with Covid pneumonia. 1. COVID-19 pneumonia  -CTA chest significant for small scattered pulmonary opacities. Tested +4/26.  -Started on Decadron and remdesivir by primary  -D-dimer 337, CRP-1.7, pro-Angel 0.08    2.  Pulmonary embolism  -D-dimer 337, troponin less than 0.01, BNP-14   -CTA concerning for left lower lobe PE, will need 3 months of anticoagulation  -On therapeutic dose Lovenox      PLAN:  *-Follow echocardiogram  *-Continue remdesivir and Decadron per primary  *-Continue therapeutic dose Lovenox  *-start  Eliquis tomorrow, will need 3 months of oral         anticoagulation  *-Wean off of oxygen as able  Discharge Plan   PT and OT     Larisa Hess MD,[facility]  Pulmonary&Critical Care Medicine   Director of [address] [place] [address] Director of [address] [place] [address]    Ryder Martinez

## 2021-04-30 NOTE — TELEPHONE ENCOUNTER
Received call from [facility]/ [facility]. States order was placed from [facility] physicians for patient to have home care. Notified Violeta Youngblood [facility] patient would have to be seen for office visit with Internal Medicine in order to follow home care. Advised Ese that when patient a hospital f/u appt is made will call [facility] and note chart. Latfrank advised, verbalized understanding. Notifiied Gordon gallagher/ Leopoldo Chroman, patient was schedule for phone hfu visit on 5-10-21 for Home Care.

## 2021-04-30 NOTE — CARE COORDINATION
Care Coordination  Day 2/5 Remdesivir, po decadron. Plan to start Eliquis per pulmonary note. Pt down to 1 ltr nc but per nursing, drops to 80's when on room air.   Plan is home with son upon discharge, will continue to follow    Concepcion Townsend
Pt's room phone is busy. Put sticky note for Walking Pulse oxy testing in case 1L Oxygen is needed at discharge. Nursing still reporting that Oxy saturation drops to mid 80's when taken off of O2. Pt has no preference for DME Provider. Discharge Plan is to return home with Son when medically stable. MALDONADO/LUCAS to follow for discharge needs.    MARCIO Clark Se.S.W.  [phone]
services  [facility]  [phone]

## 2021-04-30 NOTE — PROGRESS NOTES
CLINICAL PHARMACY NOTE: MEDS TO [address] Select Patient?: No  Total # of Prescriptions Filled: 2   The following medications were delivered to the patient:  · eliquis 5mg  · dexamethasone 6mg  Total # of Interventions Completed: 3  Time Spent (min): 30    Additional Documentation:    Delivered to RN

## 2021-05-01 ENCOUNTER — CARE COORDINATION (OUTPATIENT)
Dept: CASE MANAGEMENT | Age: 71
End: 2021-05-01

## 2021-05-01 DIAGNOSIS — I26.99 PULMONARY EMBOLISM ON LEFT (HCC): Primary | ICD-10-CM

## 2021-05-01 LAB
L. PNEUMOPHILA SEROGP 1 UR AG: NORMAL
STREP PNEUMONIAE ANTIGEN, URINE: NORMAL

## 2021-05-01 NOTE — CARE COORDINATION
[facility] Transitions Initial Follow Up Call    Call within 2 business days of discharge: Yes    Patient: Chandler Bautista Patient : 1950   MRN: [id number]  Reason for Admission: PE on left, covid-19  Discharge Date: 21 RARS: Readmission Risk Score: 13      Last Discharge [facility]       Complaint Diagnosis Description Type Department Provider    21 Shortness of Breath; Cough; Diarrhea; Generalized Body Aches Pulmonary embolism on left ([address] . .. ED to Hosp-Admission (Discharged) (ADMITTED) SEY[facility]WE [facility], DO; [place]. .. Patient's son contacted regarding COVID-19 diagnosis. Discussed COVID-19 related testing which was available at this time. Test results were positive. Patient's son informed of results, if available? Already aware    Care Transition Nurse contacted the family by telephone to perform post discharge assessment. Call within 2 business days of discharge: Yes. Verified name and  with family as identifiers. Provided introduction to self, and explanation of the CTN/ACM role, and reason for call due to risk factors for infection and/or exposure to COVID-19. Symptoms reviewed with family who verbalized the following symptoms: SOB with activity/talking remain and are not worsened. Cough continues unchanged. Patient wearing 2L NC continuously, no oximeter. Patient's son states his mother slept well last night and has a good appetite      Due to no new or worsening symptoms encounter was not routed to provider for escalation. Discussed follow-up appointments.  If no appointment was previously scheduled, appointment scheduling offered: N/A  [facility] follow up appointment(s):   Future Appointments   Date Time Provider Jarad Lowe   5/10/2021  2:00 PM Juve Oseguera MD [facility]     Non-[facility] follow up appointment(s): none    Non-face-to-face services provided:  Scheduled appointment with PCP-5/10/21 virtual visit  Obtained and reviewed

## 2021-05-02 LAB
BLOOD CULTURE, ROUTINE: NORMAL
CULTURE, BLOOD 2: NORMAL

## 2021-05-04 NOTE — DISCHARGE SUMMARY
Hospital Medicine Discharge Summary    Patient: Yony Roberts     Gender: female  : 1950   Age: 69 y.o. MRN: [id number]    Code Status: full code    Primary Care Provider: Nicole Delarosa MD    Admit Date: 2021   Discharge Date: 2021      Admitting Physician: Kati Javier MD  Discharge Physician: Salud Hoyt DO     Discharge Diagnoses: Active Hospital Problems    Diagnosis Date Noted    Pulmonary embolism on left [facility]) [I26.99] 2021   covid pnem   acute hypoxic resp failure   cervical pain       Hospital Course:   * SOB was the presentation found to have a PE and COVI pos.  She was started on lovenox,  Changed to eliquis on yesterday  to dc on eliquis and titration of decadron, will finished remdesevir    **    Disposition:  Home    Exam:     BP (!) 135/58   Pulse 78   Temp 97.5 °F (36.4 °C) (Oral)   Resp 20   Ht 5' 1\" (1.549 m)   Wt 172 lb 3.2 oz (78.1 kg)   SpO2 94%   BMI 32.54 kg/m²       Gen: *well developed  HEENT: NC/AT, moist mucous membranes  Neck: supple, trachea midline, no anterior cervical or SC LAD  Heart:  Normal s1/s2, RRR, no murmurs, gallops, or rubs. Lungs:  *rhonchi noted * bilaterally,  Abd: bowel sounds present, soft, nontender, nondistended, no masses  Extrem:  No clubbing, cyanosis,  *no* edema  Skin: no rashes or lesions  Psych: A & O x3  Neuro: grossly intact, moves all four extremities.    Capillary Refill: Brisk,< 3 seconds   Peripheral Pulses: +2 palpable, equal bilaterally      Consults:     IP CONSULT TO INTERNAL MEDICINE  IP CONSULT TO PULMONOLOGY  PHARMACY TO DOSE MEDICATION  IP CONSULT TO HOME CARE NEEDS    Labs:  For convenience and continuity at follow-up the following most recent labs are provided:    Lab Results   Component Value Date    WBC 9.8 2021    HGB 13.3 2021    HCT 39.0 2021    MCV 92.2 2021     2021     2021    K 3.7 2021     2021    CO2 26 04/30/2021    BUN 11 04/30/2021    CREATININE 0.7 04/30/2021    CALCIUM 8.3 04/30/2021    ALKPHOS 61 04/30/2021    ALT 11 04/30/2021    AST 23 04/30/2021    BILITOT 0.3 04/30/2021    LABALBU 3.2 04/30/2021    LDLCALC 117 01/16/2021    TRIG 66 01/16/2021     No results found for: INR    Radiology:  CTA PULMONARY W CONTRAST   Final Result   1. Limited evaluation for pulmonary embolism due to respiratory motion   artifact. Probable small peripheral emboli in the left lower lobe. 2. Small scattered alveolar opacities in the lungs consistent with pneumonia. 3. Cholelithiasis. XR CHEST PORTABLE   Final Result   No acute cardiopulmonary abnormality. Discharge Medications:   Discharge Medication List as of 4/30/2021  4:26 PM      START taking these medications    Details   albuterol-ipratropium (COMBIVENT RESPIMAT)  MCG/ACT AERS inhaler Inhale 1 puff into the lungs every 6 hours while awake, Disp-1 Inhaler, R-0Normal      apixaban (ELIQUIS) 5 MG TABS tablet 4/29-5/5,  10 MG OR 2 TABLETS BID 5/6 START 1 TABLET BID, Disp-60 tablet, R-0Normal      dexamethasone (DECADRON) 6 MG tablet Take 1 tablet by mouth daily for 10 days, Disp-7 tablet, R-0Normal      guaiFENesin-dextromethorphan (ROBITUSSIN DM) 100-10 MG/5ML syrup Take 5 mLs by mouth every 4 hours as needed for Cough, Disp-120 mL, R-0Normal           Discharge Medication List as of 4/30/2021  4:26 PM        Discharge Medication List as of 4/30/2021  4:26 PM      CONTINUE these medications which have NOT CHANGED    Details   atorvastatin (LIPITOR) 40 MG tablet Take 1 tablet by mouth nightly, Disp-30 tablet, R-3Normal           Discharge Medication List as of 4/30/2021  4:26 PM          Follow-up appointments:  1 week    Provider Follow-up:    pmd    Condition at Discharge:  Stable    The patient was seen and examined on day of discharge and this discharge summary is in conjunction with any daily progress note from day of discharge. Time Spent on discharge is 45 minutes  in the examination, evaluation, counseling and review of medications and discharge plan. Signed:    ANNAMARIA Espinoza   5/4/2021      Thank you Magali Garcia MD for the opportunity to be involved in this patient's care.  If you have any questions or concerns please feel free to contact me at [phone]

## 2021-05-07 ENCOUNTER — CARE COORDINATION (OUTPATIENT)
Dept: CASE MANAGEMENT | Age: 71
End: 2021-05-07

## 2021-05-07 NOTE — CARE COORDINATION
[facility] Follow Up Call    2021    Patient: Nickolas Umanzor Short  Patient : 1950   MRN: [id number]  Reason for Admission: PE on left, covid-19  Discharge Date: 21 RARS: Readmission Risk Score: 13      Care Transition Nurse contacted the family by telephone to perform follow-up assessment. Patient has following risk factors of: no known risk factors. Symptoms reviewed with family who verbalized the following symptoms: Symptoms of SOB with activity/talking and cough remain per patient's son but are unchanged and not worsened. Patient remains in 2L NC continuous and is taking Robitussin DM which helps with cough per patient's son. Patient's son states he is currently at his doctor's office for covid testing as he began experiencing some symptoms recently. Was patient discharged with a pulse oximeter? No      Due to no new or worsening symptoms encounter was not routed to provider for escalation.      -Patient's son remains aware of below PCP virtual visit and that [address] will be ordered at that time. Plan for follow-up call in 5-7 days based on severity of symptoms and risk factors.         Follow Up  Future Appointments   Date Time Provider Jarad Lowe   5/10/2021  2:00 PM Anoop Ramos MD [facility] Alva Acharya RN

## 2021-05-10 ENCOUNTER — VIRTUAL VISIT (OUTPATIENT)
Dept: INTERNAL MEDICINE | Age: 71
End: 2021-05-10
Payer: COMMERCIAL

## 2021-05-10 DIAGNOSIS — I26.99 PULMONARY EMBOLISM ON LEFT (HCC): Primary | ICD-10-CM

## 2021-05-10 DIAGNOSIS — E78.5 HYPERLIPIDEMIA, UNSPECIFIED HYPERLIPIDEMIA TYPE: ICD-10-CM

## 2021-05-10 DIAGNOSIS — U07.1 PNEUMONIA DUE TO 2019 NOVEL CORONAVIRUS: ICD-10-CM

## 2021-05-10 DIAGNOSIS — J12.82 PNEUMONIA DUE TO 2019 NOVEL CORONAVIRUS: ICD-10-CM

## 2021-05-10 PROCEDURE — 99495 TRANSJ CARE MGMT MOD F2F 14D: CPT | Performed by: INTERNAL MEDICINE

## 2021-05-10 PROCEDURE — 1111F DSCHRG MED/CURRENT MED MERGE: CPT | Performed by: INTERNAL MEDICINE

## 2021-05-10 RX ORDER — ATORVASTATIN CALCIUM 40 MG/1
40 TABLET, FILM COATED ORAL NIGHTLY
Qty: 90 TABLET | Refills: 1 | Status: SHIPPED | OUTPATIENT
Start: 2021-05-10

## 2021-05-10 NOTE — PROGRESS NOTES
Post-Discharge Transitional Care Management Services or Hospital Follow Up      Marcus Ireland   YOB: 1950    Date of Office Visit:  5/10/2021  Date of Hospital Admission: 4/26/21  Date of Hospital Discharge: 4/30/21  Risk of hospital readmission (high >=14%. Medium >=10%) :Readmission Risk Score: 13      Care management risk score Rising risk (score 2-5) and Complex Care (Scores >=6): 0     Non face to face  following discharge, date last encounter closed (first attempt may have been earlier): 5/1/2021 10:01 AM    Call initiated 2 business days of discharge: Yes    Patient Active Problem List   Diagnosis    Venous stasis    Lymphedema    Isolated proteinuria with morphologic lesion    AMS (altered mental status)    Altered mental status    Panic attacks    Weakness    Gastroenteritis    Hypoalbuminemia    Hypocalcemia    Other neutropenia ([address]    Pulmonary embolism on left ([address]    Pneumonia due to 2019 novel coronavirus       No Known Allergies    Medications listed as ordered at the time of discharge from hospital   [facility] Medication Instructions DGQ:[id number]    Printed on:05/10/21 2466   Medication Information                      albuterol-ipratropium (COMBIVENT RESPIMAT)  MCG/ACT AERS inhaler  Inhale 1 puff into the lungs every 6 hours while awake             apixaban (ELIQUIS) 5 MG TABS tablet  1 TABLET BID  Please complete the total course of 3 months, starting 4/29, and ending 7/29.   If any question, please call our clinic             atorvastatin (LIPITOR) 40 MG tablet  Take 1 tablet by mouth nightly             guaiFENesin-dextromethorphan (ROBITUSSIN DM) 100-10 MG/5ML syrup  Take 5 mLs by mouth every 4 hours as needed for Cough                   Medications marked \"taking\" at this time  Outpatient Medications Marked as Taking for the 5/10/21 encounter (Virtual Visit) with Richard Rock MD   Medication Sig Dispense Refill   Amanda Borja albuterol-ipratropium (COMBIVENT RESPIMAT)  MCG/ACT AERS inhaler Inhale 1 puff into the lungs every 6 hours while awake 1 Inhaler 1    apixaban (ELIQUIS) 5 MG TABS tablet 1 TABLET BID  Please complete the total course of 3 months, starting 4/29, and ending 7/29. If any question, please call our clinic 60 tablet 0    atorvastatin (LIPITOR) 40 MG tablet Take 1 tablet by mouth nightly 90 tablet 1    guaiFENesin-dextromethorphan (ROBITUSSIN DM) 100-10 MG/5ML syrup Take 5 mLs by mouth every 4 hours as needed for Cough 120 mL 0        Medications patient taking as of now reconciled against medications ordered at time of hospital discharge: Yes    Chief Complaint   Patient presents with    Follow-Up from Hospital     covid    Other     pt still coughing  denies fever chills  biut still has sore throat   currently on 2 liters of o2       History of Present illness - Follow up of Hospital diagnosis(es):  Pt past medical history wise: HTN off meds, HLP, and OA, was recently hospitalized 4/26-4/30 d/t SOB, found to have COVID-19 PNA and PE, was treated with remdesivir 5 day course, and decadron 10 day course, with supportive measures including breathing treatment. For PE, she has probable small peripheral emboli int he Lt Lower lobe, she was started with therapeutic dose of lovenox, and transitioned to eliquis, for a complete course of 3 months of eliquis. Inpatient course: Discharge summary reviewed- see chart. Interval history/Current status: currently, pt reports improving, is compliant with treatment, moderate cough is about the same as at discharge, with sputum production of alternating whitish and yellowish, mild amount, no change since discharge, lethargy is better, so as nausea, diarrhea is resolving.  With regards to SOB, still experience, particularly during exertion, but getting better compare to at time of discharge, remained on 2 L of oxygen at home, which was new to her compare to before COVID-19 infection. Her son reports some confusion of HHC, citing that home oxygen was set up and no follow up. It is likely because Leonidas García is pending on pt hospital fu visit. . Our clinic nurse will follow up for resolution. Her son also reports lost of Lipitor refill, we will send the new script for refill    A comprehensive review of systems was negative except for what was noted in the HPI. There were no vitals filed for this visit. There is no height or weight on file to calculate BMI. Wt Readings from Last 3 Encounters:   04/30/21 172 lb 3.2 oz (78.1 kg)   01/28/21 156 lb (70.8 kg)   01/16/21 157 lb 7 oz (71.4 kg)     BP Readings from Last 3 Encounters:   04/30/21 (!) 135/58   01/28/21 124/76   01/17/21 114/70        Physical Exam:  Pt is AO, thought is appropriate. Rest is limited d/t the nature of televisit    Assessment/Plan:  1. Hyperlipidemia, unspecified hyperlipidemia type  - atorvastatin (LIPITOR) 40 MG tablet; Take 1 tablet by mouth nightly  Dispense: 90 tablet; Refill: 1    2. Pulmonary embolism on left [facility])  - for a total course of 3 months  - apixaban (ELIQUIS) 5 MG TABS tablet; 1 TABLET BID  Please complete the total course of 3 months, starting 4/29, and ending 7/29. If any question, please call our clinic  Dispense: 60 tablet; Refill: 0  - refer to Pulmonary, Dr. Salina Luis    3. Pneumonia due to 2019 novel coronavirus  -pt completed  remdesivir 5 day course, and decadron 10 day course,   - albuterol-ipratropium (COMBIVENT RESPIMAT)  MCG/ACT AERS inhaler; Inhale 1 puff into the lungs every 6 hours while awake  Dispense: 1 Inhaler; Refill: 1        Medical Decision Making: moderate complexity    RTC: ~ 1 months, VV    --Jared Haddad MD , PGY-1    Case discussed with Attending Dr. Syeda Thakkar, was evaluated through a synchronous (real-time) audio-video encounter. The patient (or guardian if applicable) is aware that this is a billable service.  Verbal consent to proceed has

## 2021-05-10 NOTE — PATIENT INSTRUCTIONS
Return for next virtual visit appointment in 6/16 230pm  Pulmonary doctor, Dr. Porsche Israel contact info, please call for an appointment if you do not get contacted  Sheila Tafoya MD.  [address] [facility], [address]  [address] [place] [address] After Treatment for COVID-19: Care Instructions  Overview     You are being sent home from the hospital after being treated for COVID-19. Being in the hospital can be hard, especially if you've been in the intensive care unit (ICU). Even though you're going home, you probably don't feel well yet. Healing from COVID-19 takes time. You may feel very tired for weeks or months afterward, especially if you were on a ventilator. It will take time to get back to your old level of activity. Some people may have long-lasting health problems. But most people can look forward to feeling a little better every day. If you were on a ventilator, your throat may be sore and your voice hoarse or raspy for a while. After leaving the hospital, some people have feelings of anxiety and depression. They may have nightmares. Or in their mind they may relive events that happened in the hospital (flashbacks). You can always reach out to your doctor if you're having trouble with these symptoms. Your doctor will tell you if you need to isolate yourself at home, and when you can end isolation. Follow-up care is a key part of your treatment and safety. Be sure to make and go to all appointments, and call your doctor if you are having problems. It's also a good idea to know your test results and keep a list of the medicines you take. How can you care for yourself at home? · Get plenty of rest. It can help you feel better. · Be kind to yourself if it's taking longer than you expected to feel better. You've been through a stressful time. · Get up and walk around every hour or two while you're resting.  Slowly increase your activity as you start to feel better. · Eat healthy foods. · Drink plenty of fluids. If you have kidney, heart, or liver disease and have to limit fluids, talk with your doctor before you increase the amount of fluids you drink. · Take acetaminophen (such as Tylenol) to reduce a fever. It may also help with muscle aches. Read and follow all instructions on the label. If you are in isolation after you get home  · Wear a cloth face cover when you are around other people. It can help stop the spread of the virus when you cough or sneeze. · Limit contact with people in your home. If possible, stay in a separate bedroom and use a separate bathroom. · If you have to leave home, avoid crowds and try to stay at least 6 feet away from other people. · Avoid contact with pets and other animals. · Cover your mouth and nose with a tissue when you cough or sneeze. Then throw it in the trash right away. · Wash your hands often, especially after you cough or sneeze. Use soap and water, and scrub for at least 20 seconds. If soap and water aren't available, use an alcohol-based hand . · Don't share personal household items. These include bedding, towels, cups and glasses, and eating utensils. · [address] in the warmest water allowed for the fabric type, and dry it completely. It's okay to wash other people's laundry with yours. · Clean and disinfect your home every day. Use household  and disinfectant wipes or sprays. Take special care to clean things that you grab with your hands. These include doorknobs, remote controls, phones, and handles on your refrigerator and microwave. And don't forget countertops, tabletops, bathrooms, and computer keyboards. When should you call for help? Call 911 anytime you think you may need emergency care. For example, call if you have life-threatening symptoms, such as:    · You have severe trouble breathing.  (You can't talk at all.)     · You have constant chest pain or pressure.     · You are severely dizzy or lightheaded.     · You are confused or can't think clearly.     · Your face and lips have a blue color.     · You passed out (lost consciousness) or are very hard to wake up. Call your doctor now or seek immediate medical care if:    · You have moderate trouble breathing. (You can't speak a full sentence.)     · You are coughing up blood (more than about 1 teaspoon).     · You have signs of low blood pressure. These include feeling lightheaded; being too weak to stand; and having cold, pale, clammy skin. Watch closely for changes in your health, and be sure to contact your doctor if:    · Your symptoms get worse.     · You are not getting better as expected.     · You have new or worse symptoms of anxiety, depression, nightmares, or flashbacks. Call before you go to the doctor's office. Follow their instructions. And wear a cloth face cover. Current as of: February 19, 2021               Content Version: 12.8  © 2006-2021 Healthwise, Inc[employer]. Care instructions adapted under license by [facility] ([facility]).  If you have questions about a medical condition or this instruction, always ask your healthcare professional. [place] [address] any warranty or liability for your use of this information.

## 2021-05-10 NOTE — PROGRESS NOTES
[facility]  Internal Medicine Clinic    Attending Physician's Statement      TeleMedicine Patient Consent    This visit was performed as phone visit. Patient identification was verified at the start of the visit, including the patient's telephone number and physical location. I discussed with the patient the nature of our telehealth visits, that:     1. I would evaluate the patient and recommend diagnostics and treatments based on my assessment. 2. If it is felt that the patient should be evaluated in the clinic or an emergency room setting, then they would be directed there. 3. Our sessions are not being recorded and that personal health information is protected. 4. Our team would provide follow up care in person if/when the patient needs it. Patient does agree to proceed with telemedicine consultation. Patient's location: [place] address [place]    I have discussed the case, including pertinent history with the medical resident. I agree with the assessment, plan and orders as documented by the resident. I have reviewed all the pertinent PMHx, PSHx, Family Hx, Social Hx, medications and allergies, and updated history as appropriate. Patient presents for telephone visit for HFU. Admitted 4/26/21 for covid pneumonia and PE. She is on eliquis and 2L oxygen. Plan is to do 3 months of eliquis per Pulmonary notes. Symptoms are currently improving, we will re-assess again in 1 month. She remains off blood pressure medications. Advised to follow up with Dr Kathryn Mary as well. Home health ordered 4/30/21 by Dr Yancy De Luna. Pertinent lab results and imaging studies have been reviewed. Medical problems, assessment and plan per medical resident's note. Elio Elks S. Fatima Gaucher, MD  Internal Medicine Residency Faculty  5/10/2021      The patient is being evaluated by a telephone encounter to address concerns as mentioned above. A caregiver was present when appropriate.  Due to this being a TeleHealth encounter (During CWRNC-00 public health emergency), evaluation of the following organ systems was limited: Vitals/Constitutional/EENT/Resp/CV/GI//MS/Neuro/Skin/Heme-Lymph-Imm. Pursuant to the emergency declaration under the [address] [place] [address] and the [employer] Act, this Virtual Visit was conducted with patient's (and/or legal guardian's) consent, to reduce the patient's risk of exposure to COVID-19 and provide necessary medical care. The patient (and/or legal guardian) has also been advised to contact this office for worsening conditions or problems, and seek emergency medical treatment and/or call 911 if deemed necessary. Services were provided through a video synchronous discussion virtually to substitute for in-person clinic visit. Patient and provider were located at their individual homes.

## 2021-05-10 NOTE — PROGRESS NOTES
All instructions reviewed with pt by dr Guerrero Del Toro during virtual phone visit printed avs with fu appt mailed to patient

## 2021-05-14 ENCOUNTER — CARE COORDINATION (OUTPATIENT)
Dept: CASE MANAGEMENT | Age: 71
End: 2021-05-14

## 2021-05-24 ENCOUNTER — CARE COORDINATION (OUTPATIENT)
Dept: CASE MANAGEMENT | Age: 71
End: 2021-05-24

## 2021-05-24 NOTE — CARE COORDINATION
[facility] Trans[facility] Follow Up Call    2021    Patient: Aren Ambrose  Patient : 1950   MRN: [id number]  Reason for Admission: PE on left, covid-19  Discharge Date: 21 RARS: Readmission Risk Score: 13        Care Transition Nurse contacted the family by telephone to perform follow-up assessment. Patient has following risk factors of: no known risk factors. Symptoms reviewed with family who verbalized the following symptoms: Patient continues to do well, having no symptoms, remains in oxygen at 2L NC. Due to no new or worsening symptoms encounter was not routed to provider for escalation.      -Spoke with patient's son Bismark(on communication release of information form)and the patient briefly for follow up COVID-19 monitoring care transition call.   -Coletta Ahumada states he has not received any paperwork in the mail from PCP office regarding pulmonary referral and has not heard from pulmonary office. CTN provided Rolylillian Ahumada with phone number for pulmonary office as noted in last PCP note  to call pulmonary office if they are not contacted by office.  -Coletta Ahumada states his mother has an appointment tomorrow(21)at [facility] but he is not certain what for. CTN inquired to patient and her son if patient was switching PCP's. Patient unsure at this time stating visit was [facility] related/covid testing. \"  CTN will follow up on next call with plan to be final call. Patient's son has this CTN's contact information if any needs arise.             Follow Up  Future Appointments   Date Time Provider Jarad Lowe   2021  2:30 PM Sylvia Leyden, MD [facility]       Joon Montero RN

## 2021-06-03 ENCOUNTER — CARE COORDINATION (OUTPATIENT)
Dept: CASE MANAGEMENT | Age: 71
End: 2021-06-03

## 2021-07-26 ENCOUNTER — OFFICE VISIT (OUTPATIENT)
Dept: PULMONOLOGY | Age: 71
End: 2021-07-26
Payer: COMMERCIAL

## 2021-07-26 ENCOUNTER — HOSPITAL ENCOUNTER (OUTPATIENT)
Age: 71
Discharge: HOME | End: 2021-07-26
Payer: COMMERCIAL

## 2021-07-26 VITALS
OXYGEN SATURATION: 96 % | DIASTOLIC BLOOD PRESSURE: 70 MMHG | WEIGHT: 152.6 LBS | RESPIRATION RATE: 16 BRPM | BODY MASS INDEX: 27.04 KG/M2 | TEMPERATURE: 98 F | HEART RATE: 82 BPM | HEIGHT: 63 IN | SYSTOLIC BLOOD PRESSURE: 121 MMHG

## 2021-07-26 DIAGNOSIS — U07.1 COVID-19: Primary | ICD-10-CM

## 2021-07-26 DIAGNOSIS — U07.1 COVID-19: ICD-10-CM

## 2021-07-26 LAB — D DIMER: <200 NG/ML DDU

## 2021-07-26 PROCEDURE — G8427 DOCREV CUR MEDS BY ELIG CLIN: HCPCS | Performed by: INTERNAL MEDICINE

## 2021-07-26 PROCEDURE — 4040F PNEUMOC VAC/ADMIN/RCVD: CPT | Performed by: INTERNAL MEDICINE

## 2021-07-26 PROCEDURE — 1090F PRES/ABSN URINE INCON ASSESS: CPT | Performed by: INTERNAL MEDICINE

## 2021-07-26 PROCEDURE — 99213 OFFICE O/P EST LOW 20 MIN: CPT | Performed by: INTERNAL MEDICINE

## 2021-07-26 PROCEDURE — 85378 FIBRIN DEGRADE SEMIQUANT: CPT

## 2021-07-26 PROCEDURE — 1123F ACP DISCUSS/DSCN MKR DOCD: CPT | Performed by: INTERNAL MEDICINE

## 2021-07-26 PROCEDURE — 36415 COLL VENOUS BLD VENIPUNCTURE: CPT

## 2021-07-26 PROCEDURE — 3017F COLORECTAL CA SCREEN DOC REV: CPT | Performed by: INTERNAL MEDICINE

## 2021-07-26 PROCEDURE — G8417 CALC BMI ABV UP PARAM F/U: HCPCS | Performed by: INTERNAL MEDICINE

## 2021-07-26 PROCEDURE — 1036F TOBACCO NON-USER: CPT | Performed by: INTERNAL MEDICINE

## 2021-07-26 PROCEDURE — G8400 PT W/DXA NO RESULTS DOC: HCPCS | Performed by: INTERNAL MEDICINE

## 2021-07-26 NOTE — PROGRESS NOTES
Hospital follow up visit today in office and pt reports she is doing good and wants to return to work as soon as she can. Ambulatory pulse oximetry done to ensure Oxygen is no longer needed. O2 at rest on room air was 95-96% and with ambulation in office today the lowest it was dropped to 92%. Pt no longer in need of oxygen. Pt to get D Dimer labwork completed and if WNL will stop Eliquis as she has been on for 4 mos. D Dimer results will be called if they indicate pt needs to be on anticoagulant. Pt given release to return to work and letter provided. Plan for follow up in office in 1 year; appt card given. Office to fax [employer] company orders to discontinue Oxygen at home.

## 2021-07-26 NOTE — LETTER
[facility] Pulmonary [facility] and [address] [place] [address]  Phone: [phone]  Fax: [phone]    Cody Borjas MD        July 26, 2021     Patient: Agnes Ordoñez   YOB: 1950   Date of Visit: 7/26/2021       To Whom It May Concern:    Patient was seen in office today July 26,2021 by Dr. Jerome Yao; it is his  medical opinion that Cresencio Michel may return to full duty immediately with no restrictions. If you have any questions or concerns, please don't hesitate to call. Sincerely,      Moises Lomax.  [place] [facility] BSN RN

## 2021-07-26 NOTE — PROGRESS NOTES
Pulmonary [facility]                             Pulmonary Consult/Progress Note :            Reason for Consultation: Pulmonary embolism  CC : Fatigue, body aches, cough  HPI:   Dain Ugalde is a 69y.o. year old female with PMH of  hypertension, arthritis, HLD, intermittent panic attacks, with lumbar disc herniation presented to the ED with a 4 to 5-day history of fatigue, body aches and cough. On presentation patient complained of shortness of breath, diarrhea and nausea as well. Patient denies loss of taste, smell. In the emergency department patient was initially noted to be hypoxic and placed on 2 L nasal cannula. Chest x-ray was within normal limits. CTA chest was significant for left lower lung pulmonary embolus and pneumonia . Noted to be Covid positive, D-dimer of 337 patient was noted to be Covid positive. Patient received Decadron and Lovenox in the emergency department. Oxygen was later decreased to 1 L. Pulmonology was consulted for pulmonary embolism      Today Visit    patient has been doing better  SOB has improved  She is off Eliquis  after finish 3 months   No fever or chills  No chest pain  ambulated and Ct above 90     PAST MEDICAL HISTORY:     Past Medical History:   Diagnosis Date    Hyperlipidemia     Hypertension        PAST SURGICAL HISTORY:   Past Surgical History:   Procedure Laterality Date    APPENDECTOMY       SECTION      HYSTERECTOMY         FAMILY HISTORY:   No family history on file. SOCIAL HISTORY:   Social History     Socioeconomic History    Marital status:       Spouse name: Not on file    Number of children: Not on file    Years of education: Not on file    Highest education level: Not on file   Occupational History    Not on file   Tobacco Use    Smoking status: Never Smoker    Smokeless tobacco: Never Used   Substance and Sexual Activity    Alcohol use: No    Drug use: No    Sexual activity: Not on file   Other Topics Concern    Not on file   Social History Narrative    Not on file     Social Determinants of Health     Financial Resource Strain: Medium Risk    Difficulty of Paying Living Expenses: Somewhat hard   Food Insecurity: No Food Insecurity    Worried About Running Out of Food in the Last Year: Never true    George of Food in the Last Year: Never true   Transportation Needs: No Transportation Needs    Lack of Transportation (Medical): No    Lack of Transportation (Non-Medical): No   Physical Activity:     Days of Exercise per Week:     Minutes of Exercise per Session:    Stress:     Feeling of Stress :    Social Connections:     Frequency of Communication with Friends and Family:     Frequency of Social Gatherings with Friends and Family:     Attends Scientologist Services:     Active Member of Clubs or Organizations:     Attends Club or Organization Meetings:     Marital Status:    Intimate Partner Violence:     Fear of Current or Ex-Partner:     Emotionally Abused:     Physically Abused:     Sexually Abused:      Social History     Tobacco Use   Smoking Status Never Smoker   Smokeless Tobacco Never Used     Social History     Substance and Sexual Activity   Alcohol Use No     Social History     Substance and Sexual Activity   Drug Use No     HOME MEDICATIONS:  Prior to Admission medications    Medication Sig Start Date End Date Taking? Authorizing Provider   atorvastatin (LIPITOR) 40 MG tablet Take 1 tablet by mouth nightly 5/10/21  Yes Samy Chris MD   albuterol-ipratropium (COMBIVENT RESPIMAT)  MCG/ACT AERS inhaler Inhale 1 puff into the lungs every 6 hours while awake 5/10/21   Samy Chris MD   apixaban (ELIQUIS) 5 MG TABS tablet 1 TABLET BID  Please complete the total course of 3 months, starting 4/29, and ending 7/29.   If any question, please call our clinic 5/10/21   Samy Chris MD       CURRENT MEDICATIONS:  No current facility-administered medications for this visit. IV MEDICATIONS:      ALLERGIES:  No Known Allergies    REVIEW OF SYSTEMS:  General ROS:  No weight loss ,no fatigue     ENT ROS:   + Sore throat ,no lymphoadenopathy,no nasal stuffiness     Hematological and Lymphatic ROS:   No ecchymosis ,no tendency to bleed  Respiratory ROS:   Denies SOB, chest pain, orthopnea. + cough   Cardiovascular ROS:   No CP,No Palpitation   Gastrointestinal ROS:   No Gi bleed,no nausea or vomiting. + watery bowel movements     - Musculoskeletal ROS:      - no joint swelling ,no joint pain   Neurological ROS:     -no weakness or numbness    Dermatological ROS:   No skin rash ,no urticaria     PHYSICAL EXAMINATION:     VITAL SIGNS:  There were no vitals taken for this visit. Wt Readings from Last 3 Encounters:   04/30/21 172 lb 3.2 oz (78.1 kg)   01/28/21 156 lb (70.8 kg)   01/16/21 157 lb 7 oz (71.4 kg)     Temp Readings from Last 3 Encounters:   04/30/21 97.5 °F (36.4 °C) (Oral)   01/28/21 97.7 °F (36.5 °C) (Temporal)   01/17/21 97.8 °F (36.6 °C)     TMAX:  BP Readings from Last 3 Encounters:   04/30/21 (!) 135/58   01/28/21 124/76   01/17/21 114/70     Pulse Readings from Last 3 Encounters:   04/30/21 78   01/28/21 61   01/17/21 63           INTAKE/OUTPUTS:  No intake/output data recorded.   No intake or output data in the 24 hours ending 07/26/21 1207    General Appearance: alert and oriented to person, place and time, well-developed and   well-nourished, in no acute distress   Eyes: pupils equal, round, and reactive to light, extraocular eye movements intact, conjunctivae normal and sclera anicteric   Neck: neck supple and non tender without mass, no thyromegaly, no thyroid nodules and no cervical adenopathy   Pulmonary/Chest: CTAB, no wheezing, rhonchi or rales   Cardiovascular: normal rate, regular rhythm, normal S1 and S2, no murmurs, rubs, clicks or gallops, distal pulses intact, no carotid bruits, no murmurs, no gallops, no carotid bruits and no JVD Abdomen: obese, soft, non-tender, non-distended, normal bowel sounds, no masses or organomegaly   Extremities:No LE edema, erythema, swelling or tenderness   Musculoskeletal: normal range of motion, no joint swelling, deformity or tenderness   Neurologic: reflexes normal and symmetric, no cranial nerve deficit noted    LABS/IMAGING:    CBC:  Lab Results   Component Value Date    WBC 9.8 04/30/2021    HGB 13.3 04/30/2021    HCT 39.0 04/30/2021    MCV 92.2 04/30/2021     (L) 04/30/2021    LYMPHOPCT 10.4 (L) 04/30/2021    RBC 4.23 04/30/2021    MCH 31.4 04/30/2021    MCHC 34.1 04/30/2021    RDW 14.4 04/30/2021    NEUTOPHILPCT 83.5 (H) 04/30/2021    MONOPCT 5.2 04/30/2021    BASOPCT 0.4 04/30/2021    NEUTROABS 8.23 (H) 04/30/2021    LYMPHSABS 0.98 (L) 04/30/2021    MONOSABS 0.49 04/30/2021    EOSABS 0.00 (L) 04/30/2021    BASOSABS 0.00 04/30/2021       No results for input(s): WBC, HGB, HCT, MCV, PLT in the last 720 hours. BMP:   No results for input(s): NA, K, CL, CO2, PHOS, BUN, CREATININE in the last 72 hours. Invalid input(s): CA    MG:   Lab Results   Component Value Date    MG 1.9 04/27/2021     Ca/Phos:   Lab Results   Component Value Date    CALCIUM 8.3 (L) 04/30/2021     Amylase: No results found for: AMYLASE  Lipase:   Lab Results   Component Value Date    LIPASE 139 (H) 04/26/2021     LIVER PROFILE:   No results for input(s): AST, ALT, LIPASE, BILIDIR, BILITOT, ALKPHOS in the last 72 hours. Invalid input(s): AMYLASE,  ALB    PT/INR: No results for input(s): PROTIME, INR in the last 72 hours. APTT: No results for input(s): APTT in the last 72 hours. Cardiac Enzymes:  Lab Results   Component Value Date    TROPONINI <0.01 04/26/2021         MICROBIOLOGY: COVID-19 positive4/26/21    CXR: 4/26/2021- No acute cardiopulmonary abnormality    PFTs: None available at this time    2D Echocardiogram: January 2018    CT Chest: 4/26/2021- Probable small peripheral emboli in the left lower lobe. Small scattered alveolar opacities in the lungs consistent with pneumonia     PROBLEM LIST:  Patient Active Problem List   Diagnosis    Venous stasis    Lymphedema    Isolated proteinuria with morphologic lesion    AMS (altered mental status)    Altered mental status    Panic attacks    Weakness    Gastroenteritis    Hypoalbuminemia    Hypocalcemia    Other neutropenia (HCC)    Pulmonary embolism on left (HCC)    Pneumonia due to 2019 novel coronavirus       ASSESSMENT:  58-year-old female with past medical history of hyperlipidemia, hypertension -on compliant with medication who initially presented with cough, fatigue and body aches was diagnosed with COVID-19 pneumonia. CTA chest was also significant for questionable pulmonary small embolism of left lower lobe with mild patchy infiltrates, correlating with Covid pneumonia. 1. COVID-19 pneumonia  -CTA chest significant for small scattered pulmonary opacities. Tested +4/26.  -Started on Decadron and remdesivir by primary  -D-dimer 337, CRP-1.7, pro-Angel 0.08    2.  Pulmonary embolism  -D-dimer 337, troponin less than 0.01, BNP-14   -CTA concerning for left lower lobe PE, will need 3 months of anticoagulation  -On therapeutic dose Lovenox      PLAN:  *-ambulated and sat remian above 94 ,so will take off O2  *-she stopped Elquis ,will check D Dimer and then decide if we need longer   *-Switch to Eliquis tomorrow, will need 3 months of oral anticoagulation  *-Wean off of oxygen as able  PE was very small and if D dimer ok ,I am k with stopping her elquis     Larisa Hess MD,[facility]  Pulmonary&Critical Care Medicine   Director of [address] [place] [address] Director of [address] [place] [address]    Jayna Bonilla

## 2021-08-03 ENCOUNTER — HOSPITAL ENCOUNTER (EMERGENCY)
Age: 71
Discharge: HOME | End: 2021-08-03
Attending: STUDENT IN AN ORGANIZED HEALTH CARE EDUCATION/TRAINING PROGRAM
Payer: COMMERCIAL

## 2021-08-03 ENCOUNTER — APPOINTMENT (OUTPATIENT)
Dept: CT IMAGING | Age: 71
End: 2021-08-03
Payer: COMMERCIAL

## 2021-08-03 VITALS
BODY MASS INDEX: 27.97 KG/M2 | RESPIRATION RATE: 16 BRPM | WEIGHT: 152 LBS | SYSTOLIC BLOOD PRESSURE: 142 MMHG | OXYGEN SATURATION: 98 % | TEMPERATURE: 97 F | HEART RATE: 70 BPM | DIASTOLIC BLOOD PRESSURE: 80 MMHG | HEIGHT: 62 IN

## 2021-08-03 DIAGNOSIS — M54.9 ACUTE BACK PAIN, UNSPECIFIED BACK LOCATION, UNSPECIFIED BACK PAIN LATERALITY: Primary | ICD-10-CM
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
## 2021-08-03 PROCEDURE — 70450 CT HEAD/BRAIN W/O DYE: CPT

## 2021-08-03 PROCEDURE — 99284 EMERGENCY DEPT VISIT MOD MDM: CPT

## 2021-08-03 PROCEDURE — 6370000000 HC RX 637 (ALT 250 FOR IP): Performed by: STUDENT IN AN ORGANIZED HEALTH CARE EDUCATION/TRAINING PROGRAM

## 2021-08-03 PROCEDURE — 72125 CT NECK SPINE W/O DYE: CPT

## 2021-08-03 PROCEDURE — 72128 CT CHEST SPINE W/O DYE: CPT

## 2021-08-03 RX ORDER — IBUPROFEN 600 MG/1
600 TABLET ORAL 4 TIMES DAILY PRN
Qty: 40 TABLET | Refills: 0 | Status: SHIPPED | OUTPATIENT
Start: 2021-08-03

## 2021-08-03 RX ORDER — HYDROCODONE BITARTRATE AND ACETAMINOPHEN 5; 325 MG/1; MG/1
1 TABLET ORAL ONCE
Status: COMPLETED | OUTPATIENT
Start: 2021-08-03 | End: 2021-08-03

## 2021-08-03 RX ORDER — ACETAMINOPHEN 500 MG
500 TABLET ORAL 4 TIMES DAILY PRN
Qty: 40 TABLET | Refills: 0 | Status: SHIPPED | OUTPATIENT
Start: 2021-08-03

## 2021-08-03 RX ADMIN — HYDROCODONE BITARTRATE AND ACETAMINOPHEN 1 TABLET: 5; 325 TABLET ORAL at 20:08

## 2021-08-03 ASSESSMENT — PAIN SCALES - GENERAL
PAINLEVEL_OUTOF10: 8
PAINLEVEL_OUTOF10: 2
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
